# Patient Record
Sex: FEMALE | Race: WHITE | NOT HISPANIC OR LATINO | Employment: FULL TIME | URBAN - METROPOLITAN AREA
[De-identification: names, ages, dates, MRNs, and addresses within clinical notes are randomized per-mention and may not be internally consistent; named-entity substitution may affect disease eponyms.]

---

## 2020-12-14 ENCOUNTER — OFFICE VISIT (OUTPATIENT)
Dept: PODIATRY | Facility: CLINIC | Age: 60
End: 2020-12-14
Payer: COMMERCIAL

## 2020-12-14 VITALS
SYSTOLIC BLOOD PRESSURE: 128 MMHG | HEIGHT: 64 IN | WEIGHT: 110 LBS | BODY MASS INDEX: 18.78 KG/M2 | DIASTOLIC BLOOD PRESSURE: 69 MMHG | HEART RATE: 56 BPM

## 2020-12-14 DIAGNOSIS — M79.674 PAIN OF TOE OF RIGHT FOOT: ICD-10-CM

## 2020-12-14 DIAGNOSIS — L84 CORN OF TOE: ICD-10-CM

## 2020-12-14 DIAGNOSIS — M20.11 HALLUX VALGUS OF RIGHT FOOT: Primary | ICD-10-CM

## 2020-12-14 PROCEDURE — 1126F PR PAIN SEVERITY QUANTIFIED, NO PAIN PRESENT: ICD-10-PCS | Mod: S$GLB,,, | Performed by: PODIATRIST

## 2020-12-14 PROCEDURE — 99999 PR PBB SHADOW E&M-NEW PATIENT-LVL III: CPT | Mod: PBBFAC,,, | Performed by: PODIATRIST

## 2020-12-14 PROCEDURE — 99203 OFFICE O/P NEW LOW 30 MIN: CPT | Mod: S$GLB,,, | Performed by: PODIATRIST

## 2020-12-14 PROCEDURE — 1126F AMNT PAIN NOTED NONE PRSNT: CPT | Mod: S$GLB,,, | Performed by: PODIATRIST

## 2020-12-14 PROCEDURE — 3008F PR BODY MASS INDEX (BMI) DOCUMENTED: ICD-10-PCS | Mod: CPTII,S$GLB,, | Performed by: PODIATRIST

## 2020-12-14 PROCEDURE — 99203 PR OFFICE/OUTPT VISIT, NEW, LEVL III, 30-44 MIN: ICD-10-PCS | Mod: S$GLB,,, | Performed by: PODIATRIST

## 2020-12-14 PROCEDURE — 3008F BODY MASS INDEX DOCD: CPT | Mod: CPTII,S$GLB,, | Performed by: PODIATRIST

## 2020-12-14 PROCEDURE — 99999 PR PBB SHADOW E&M-NEW PATIENT-LVL III: ICD-10-PCS | Mod: PBBFAC,,, | Performed by: PODIATRIST

## 2020-12-14 RX ORDER — UREA 200 MG/G
1 CREAM TOPICAL DAILY
Qty: 75 G | Refills: 10 | Status: SHIPPED | OUTPATIENT
Start: 2020-12-14 | End: 2020-12-24

## 2020-12-14 RX ORDER — ESTRADIOL 0.5 MG/1
0.5 TABLET ORAL DAILY
COMMUNITY
Start: 2020-12-02 | End: 2021-06-25 | Stop reason: SDUPTHER

## 2020-12-14 RX ORDER — PRASTERONE 6.5 MG/1
INSERT VAGINAL
COMMUNITY
Start: 2020-11-05 | End: 2021-06-01

## 2020-12-14 NOTE — PROGRESS NOTES
Chief Complaint   Patient presents with    Callouses             HPI:   Lily Fraire is a 60 y.o. female who presents to clinic with complaints of a painful corn on the right 2nd toe.  Present before Thanksgiving.  She reported that she started using salicylic acid over-the-counter pad to the toe.  Reported that area started to burn.  Feeling a little bit better today.  She is on her feet a lot during the day.  Today she is wearing low heeled boots that are steeled toe.  States that they are uncomfortable.  She has been wearing them close to a year.  She does have athletic shoes that she wears when she is exercising.   Denies acute trauma to the feet..      PCP:  Primary Doctor No        There is no problem list on file for this patient.          Current Outpatient Medications on File Prior to Visit   Medication Sig Dispense Refill    estradioL (ESTRACE) 0.5 MG tablet Take 0.5 mg by mouth once daily.      INTRAROSA 6.5 mg Inst INSERT 1 SUPPOSITORY VAGINALLY AT BEDTIME       No current facility-administered medications on file prior to visit.          ALLG:  Review of patient's allergies indicates:  No Known Allergies        Social History     Socioeconomic History    Marital status:      Spouse name: Not on file    Number of children: Not on file    Years of education: Not on file    Highest education level: Not on file   Occupational History    Not on file   Social Needs    Financial resource strain: Not on file    Food insecurity     Worry: Not on file     Inability: Not on file    Transportation needs     Medical: Not on file     Non-medical: Not on file   Tobacco Use    Smoking status: Never Smoker    Smokeless tobacco: Never Used   Substance and Sexual Activity    Alcohol use: Not Currently    Drug use: Not Currently    Sexual activity: Not Currently   Lifestyle    Physical activity     Days per week: Not on file     Minutes per session: Not on file    Stress: Not on file  "  Relationships    Social connections     Talks on phone: Not on file     Gets together: Not on file     Attends Druze service: Not on file     Active member of club or organization: Not on file     Attends meetings of clubs or organizations: Not on file     Relationship status: Not on file   Other Topics Concern    Not on file   Social History Narrative    Not on file           ROS:  General ROS: negative for - chills, fatigue or fever  Cardiovascular ROS: no chest pain or dyspnea on exertion  Musculoskeletal ROS: negative for - joint pain or joint stiffness   Skin: Negative for rash, negative for nail or hair changes. Positive for  Corn/callus on the foot.       EXAM:  Vitals:    12/14/20 0802   BP: 128/69   BP Location: Left arm   Patient Position: Sitting   BP Method: Medium (Automatic)   Pulse: (!) 56   Weight: 49.9 kg (110 lb)   Height: 5' 4" (1.626 m)        General: alert and orient and in no apparent distress.        Bilateral foot:  Vasc:   Palpable pedal pulses  feet appropriately warm to touch.   Capillary refill time within normal limits.   Edema: Absent    Neuro:   Epicritic sensation intact.   Tinels sign:  Absent  Mulders click: Absent  SWMF: Present    MSK:     bunion on both sides  Muscle strength:  5/5  Joints:  without crepitus  Deformity:  Hallux valgus bilateral.  Flexible hammertoes.      Derm:      Hyperkeratosis at the P IPJ of the right 2nd toe where it abuts the hallux.  There is an area of irritation/redness where the 2nd toe hits the big toe.  No open wounds.   No other open lesions, macerations, or rashes noted  Erythema:  mild at the affected location due to irritation  Bruising:  absent        ASSESSMENT / PLAN:      Problem List Items Addressed This Visit     None      Visit Diagnoses     Hallux valgus of right foot    -  Primary    Corn of toe        Relevant Medications    urea 20 % Crea    Pain of toe of right foot        Relevant Medications    urea 20 % Crea          · I " counseled the patient on the patient's conditions, their implications and medical management.  · Urea 20% as ordered.  Use daily.  · Consider moleskin to protect the area.  Mole skin applied today.  · Consider bunion night splint to prevent worsening of the hallux valgus, bilateral.   Shoe and activity modification as needed for relief.   She will get more comfortable shoes.   Call or return to clinic prn if these symptoms worsen or fail to improve as anticipated.  Patient is amenable to plan.

## 2020-12-24 ENCOUNTER — OFFICE VISIT (OUTPATIENT)
Dept: PRIMARY CARE CLINIC | Facility: CLINIC | Age: 60
End: 2020-12-24
Payer: COMMERCIAL

## 2020-12-24 ENCOUNTER — TELEPHONE (OUTPATIENT)
Dept: PRIMARY CARE CLINIC | Facility: CLINIC | Age: 60
End: 2020-12-24

## 2020-12-24 ENCOUNTER — PATIENT OUTREACH (OUTPATIENT)
Dept: ADMINISTRATIVE | Facility: HOSPITAL | Age: 60
End: 2020-12-24

## 2020-12-24 VITALS
DIASTOLIC BLOOD PRESSURE: 72 MMHG | OXYGEN SATURATION: 97 % | SYSTOLIC BLOOD PRESSURE: 110 MMHG | BODY MASS INDEX: 18.71 KG/M2 | HEART RATE: 63 BPM | RESPIRATION RATE: 16 BRPM | TEMPERATURE: 97 F | WEIGHT: 109.56 LBS | HEIGHT: 64 IN

## 2020-12-24 DIAGNOSIS — Z23 NEED FOR VACCINATION: ICD-10-CM

## 2020-12-24 DIAGNOSIS — Z76.89 ENCOUNTER TO ESTABLISH CARE: ICD-10-CM

## 2020-12-24 DIAGNOSIS — Z11.59 NEED FOR HEPATITIS C SCREENING TEST: ICD-10-CM

## 2020-12-24 DIAGNOSIS — Z01.818 PREOPERATIVE EXAMINATION: ICD-10-CM

## 2020-12-24 DIAGNOSIS — Z11.4 SCREENING FOR HIV (HUMAN IMMUNODEFICIENCY VIRUS): ICD-10-CM

## 2020-12-24 DIAGNOSIS — Z00.00 ANNUAL PHYSICAL EXAM: Primary | ICD-10-CM

## 2020-12-24 DIAGNOSIS — Z13.6 ENCOUNTER FOR SCREENING FOR CARDIOVASCULAR DISORDERS: ICD-10-CM

## 2020-12-24 PROCEDURE — 3008F BODY MASS INDEX DOCD: CPT | Mod: CPTII,S$GLB,, | Performed by: FAMILY MEDICINE

## 2020-12-24 PROCEDURE — 99999 PR PBB SHADOW E&M-EST. PATIENT-LVL III: ICD-10-PCS | Mod: PBBFAC,,, | Performed by: FAMILY MEDICINE

## 2020-12-24 PROCEDURE — 93005 ELECTROCARDIOGRAM TRACING: CPT | Mod: S$GLB,,, | Performed by: FAMILY MEDICINE

## 2020-12-24 PROCEDURE — 99386 PREV VISIT NEW AGE 40-64: CPT | Mod: S$GLB,,, | Performed by: FAMILY MEDICINE

## 2020-12-24 PROCEDURE — 3008F PR BODY MASS INDEX (BMI) DOCUMENTED: ICD-10-PCS | Mod: CPTII,S$GLB,, | Performed by: FAMILY MEDICINE

## 2020-12-24 PROCEDURE — 1126F PR PAIN SEVERITY QUANTIFIED, NO PAIN PRESENT: ICD-10-PCS | Mod: S$GLB,,, | Performed by: FAMILY MEDICINE

## 2020-12-24 PROCEDURE — 1126F AMNT PAIN NOTED NONE PRSNT: CPT | Mod: S$GLB,,, | Performed by: FAMILY MEDICINE

## 2020-12-24 PROCEDURE — 99999 PR PBB SHADOW E&M-EST. PATIENT-LVL III: CPT | Mod: PBBFAC,,, | Performed by: FAMILY MEDICINE

## 2020-12-24 PROCEDURE — 99386 PR PREVENTIVE VISIT,NEW,40-64: ICD-10-PCS | Mod: S$GLB,,, | Performed by: FAMILY MEDICINE

## 2020-12-24 PROCEDURE — 93005 EKG 12-LEAD: ICD-10-PCS | Mod: S$GLB,,, | Performed by: FAMILY MEDICINE

## 2020-12-24 RX ORDER — ZOSTER VACCINE RECOMBINANT, ADJUVANTED 50 MCG/0.5
0.5 KIT INTRAMUSCULAR ONCE
Qty: 1 EACH | Refills: 0 | Status: SHIPPED | OUTPATIENT
Start: 2021-01-13 | End: 2021-01-13

## 2020-12-24 NOTE — TELEPHONE ENCOUNTER
----- Message from Melquiades Parry MD sent at 12/24/2020  9:14 AM CST -----  Colonoscopy done roughly 2 years ago by Dr. Sis Goldberg

## 2020-12-24 NOTE — PROGRESS NOTES
"Subjective:       Patient ID: Lily Fraire is a 60 y.o. female.    Chief Complaint: Establish Care and Pre-op Exam    Here to establish care, has not had PCP for routine checkup in years, though she is up-to-date on breast and colon cancer screening.  Scheduled for cosmetic surgery (face-lift) in early February, needs surgical clearance.  No recent illness or injury.  No previous surgical complications.    Review of Systems   Constitutional: Negative for chills, fatigue and fever.   HENT: Negative for congestion.    Eyes: Negative for visual disturbance.   Respiratory: Negative for cough and shortness of breath.    Cardiovascular: Negative for chest pain.   Gastrointestinal: Negative for abdominal pain, blood in stool, nausea and vomiting.   Endocrine: Negative for polyuria.   Genitourinary: Negative for difficulty urinating.   Musculoskeletal: Negative for arthralgias.   Skin: Negative for rash and wound.   Allergic/Immunologic: Negative for immunocompromised state.   Neurological: Negative for dizziness.   Hematological: Does not bruise/bleed easily.   Psychiatric/Behavioral: Negative for sleep disturbance.       Objective:      Vitals:    12/24/20 0854   BP: 110/72   BP Location: Left arm   Patient Position: Sitting   BP Method: Medium (Manual)   Pulse: 63   Resp: 16   Temp: 97.2 °F (36.2 °C)   TempSrc: Temporal   SpO2: 97%   Weight: 49.7 kg (109 lb 9.1 oz)   Height: 5' 4" (1.626 m)     Physical Exam  Vitals signs and nursing note reviewed.   Constitutional:       Appearance: She is well-developed.   HENT:      Head: Normocephalic and atraumatic.   Eyes:      Pupils: Pupils are equal, round, and reactive to light.   Neck:      Musculoskeletal: Neck supple.      Vascular: No carotid bruit or JVD.   Cardiovascular:      Rate and Rhythm: Normal rate and regular rhythm.      Pulses:           Radial pulses are 2+ on the right side and 2+ on the left side.      Heart sounds: Normal heart sounds.   Pulmonary:      " Effort: Pulmonary effort is normal.      Breath sounds: Normal breath sounds.   Abdominal:      General: Bowel sounds are normal. There is no distension.      Palpations: Abdomen is soft.      Tenderness: There is no abdominal tenderness.   Skin:     General: Skin is warm and dry.   Neurological:      Mental Status: She is alert and oriented to person, place, and time.   Psychiatric:         Behavior: Behavior normal.         Lab Results   Component Value Date    WBC 3.70 (L) 12/24/2020    HGB 13.5 12/24/2020    HCT 40.5 12/24/2020     12/24/2020    CHOL 215 (H) 12/24/2020    TRIG 41 12/24/2020    HDL 91 (H) 12/24/2020    ALT 17 12/24/2020    AST 25 12/24/2020     12/24/2020    K 3.8 12/24/2020     12/24/2020    CREATININE 0.5 12/24/2020    BUN 19 12/24/2020    CO2 28 12/24/2020    TSH 1.19 12/24/2020    INR 1.0 12/24/2020      Assessment:       1. Annual physical exam    2. Encounter to establish care    3. Need for vaccination    4. Preoperative examination    5. Encounter for screening for cardiovascular disorders    6. Need for hepatitis C screening test    7. Screening for HIV (human immunodeficiency virus)        Plan:       Annual physical exam  -     CBC Auto Differential; Future; Expected date: 12/24/2020  -     Comprehensive Metabolic Panel; Future; Expected date: 12/24/2020  -     Lipid Panel; Future; Expected date: 12/24/2020  -     Hepatitis C Antibody; Future; Expected date: 12/24/2020  -     HIV 1/2 Ag/Ab (4th Gen); Future; Expected date: 12/24/2020  -     EKG 12-lead  -     TSH; Future; Expected date: 12/24/2020  EKG and labs reviewed, no worrisome findings  Encounter to establish care    Need for vaccination  -     varicella-zoster gE-AS01B, PF, (SHINGRIX, PF,) 50 mcg/0.5 mL injection; Inject 0.5 mLs into the muscle once. for 1 dose  Dispense: 1 each; Refill: 0    Preoperative examination  -     EKG 12-lead  -     Protime-INR; Future; Expected date: 12/24/2020  -     APTT; Future;  Expected date: 12/24/2020  Patient is medically cleared for upcoming cosmetic surgery under general anesthesia  Encounter for screening for cardiovascular disorders  -     CBC Auto Differential; Future; Expected date: 12/24/2020  -     Comprehensive Metabolic Panel; Future; Expected date: 12/24/2020  -     Lipid Panel; Future; Expected date: 12/24/2020  -     EKG 12-lead    Need for hepatitis C screening test  -     Hepatitis C Antibody; Future; Expected date: 12/24/2020    Screening for HIV (human immunodeficiency virus)  -     HIV 1/2 Ag/Ab (4th Gen); Future; Expected date: 12/24/2020      Medication List with Changes/Refills   New Medications    VARICELLA-ZOSTER GE-AS01B, PF, (SHINGRIX, PF,) 50 MCG/0.5 ML INJECTION    Inject 0.5 mLs into the muscle once. for 1 dose   Current Medications    ESTRADIOL (ESTRACE) 0.5 MG TABLET    Take 0.5 mg by mouth once daily.    INTRAROSA 6.5 MG INST    INSERT 1 SUPPOSITORY VAGINALLY AT BEDTIME   Discontinued Medications    UREA 20 % CREA    Apply 1 application topically once daily. To affected area on the foot.

## 2020-12-24 NOTE — TELEPHONE ENCOUNTER
----- Message from Melquiades Parry MD sent at 12/24/2020  9:12 AM CST -----  Had mammogram at [facility] in [place] over the summer

## 2020-12-24 NOTE — LETTER
AUTHORIZATION FOR RELEASE OF   CONFIDENTIAL INFORMATION    Dear Dr. Goldberg,    We are seeing Lily Fraire, date of birth 1960, in the clinic at [facility]. Melquiades Parry MD is the patient's PCP. Lily Fraire has an outstanding lab/procedure at the time we reviewed her chart. In order to help keep her health information updated, she has authorized us to request the following medical record(s):        (  )  MAMMOGRAM                                      ( x )  COLONOSCOPY      (  )  PAP SMEAR                                          (  )  OUTSIDE LAB RESULTS     (  )  DEXA SCAN                                          (  )  EYE EXAM            (  )  FOOT EXAM                                          (  )  ENTIRE RECORD     (  )  OUTSIDE IMMUNIZATIONS                 (  )  _______________         Please fax records to [facility], Melquiades aPrry MD, [phone]     If you have any questions, please contact Sally at [phone].           Patient Name: Lily Fraire  : 1960  Patient Phone #: [phone]

## 2021-04-16 ENCOUNTER — PATIENT MESSAGE (OUTPATIENT)
Dept: RESEARCH | Facility: HOSPITAL | Age: 61
End: 2021-04-16

## 2021-04-26 ENCOUNTER — PATIENT OUTREACH (OUTPATIENT)
Dept: ADMINISTRATIVE | Facility: HOSPITAL | Age: 61
End: 2021-04-26

## 2021-06-01 ENCOUNTER — OFFICE VISIT (OUTPATIENT)
Dept: OBSTETRICS AND GYNECOLOGY | Facility: CLINIC | Age: 61
End: 2021-06-01
Payer: COMMERCIAL

## 2021-06-01 VITALS
WEIGHT: 102.06 LBS | SYSTOLIC BLOOD PRESSURE: 116 MMHG | BODY MASS INDEX: 17.52 KG/M2 | DIASTOLIC BLOOD PRESSURE: 78 MMHG

## 2021-06-01 DIAGNOSIS — N95.2 POST-MENOPAUSAL ATROPHIC VAGINITIS: ICD-10-CM

## 2021-06-01 DIAGNOSIS — N76.0 ACUTE VAGINITIS: Primary | ICD-10-CM

## 2021-06-01 DIAGNOSIS — Z12.31 VISIT FOR SCREENING MAMMOGRAM: ICD-10-CM

## 2021-06-01 DIAGNOSIS — Z79.890 POST-MENOPAUSE ON HRT (HORMONE REPLACEMENT THERAPY): ICD-10-CM

## 2021-06-01 PROCEDURE — 99203 OFFICE O/P NEW LOW 30 MIN: CPT | Mod: S$GLB,,, | Performed by: NURSE PRACTITIONER

## 2021-06-01 PROCEDURE — 1126F AMNT PAIN NOTED NONE PRSNT: CPT | Mod: S$GLB,,, | Performed by: NURSE PRACTITIONER

## 2021-06-01 PROCEDURE — 99999 PR PBB SHADOW E&M-EST. PATIENT-LVL III: ICD-10-PCS | Mod: PBBFAC,,, | Performed by: NURSE PRACTITIONER

## 2021-06-01 PROCEDURE — 3008F PR BODY MASS INDEX (BMI) DOCUMENTED: ICD-10-PCS | Mod: CPTII,S$GLB,, | Performed by: NURSE PRACTITIONER

## 2021-06-01 PROCEDURE — 99203 PR OFFICE/OUTPT VISIT, NEW, LEVL III, 30-44 MIN: ICD-10-PCS | Mod: S$GLB,,, | Performed by: NURSE PRACTITIONER

## 2021-06-01 PROCEDURE — 99999 PR PBB SHADOW E&M-EST. PATIENT-LVL III: CPT | Mod: PBBFAC,,, | Performed by: NURSE PRACTITIONER

## 2021-06-01 PROCEDURE — 1126F PR PAIN SEVERITY QUANTIFIED, NO PAIN PRESENT: ICD-10-PCS | Mod: S$GLB,,, | Performed by: NURSE PRACTITIONER

## 2021-06-01 PROCEDURE — 3008F BODY MASS INDEX DOCD: CPT | Mod: CPTII,S$GLB,, | Performed by: NURSE PRACTITIONER

## 2021-06-01 PROCEDURE — 87481 CANDIDA DNA AMP PROBE: CPT | Mod: 59 | Performed by: NURSE PRACTITIONER

## 2021-06-01 PROCEDURE — 87661 TRICHOMONAS VAGINALIS AMPLIF: CPT | Mod: 59 | Performed by: NURSE PRACTITIONER

## 2021-06-01 RX ORDER — METRONIDAZOLE 500 MG/1
500 TABLET ORAL EVERY 12 HOURS
Qty: 14 TABLET | Refills: 0 | Status: SHIPPED | OUTPATIENT
Start: 2021-06-01 | End: 2021-06-08

## 2021-06-01 RX ORDER — CLOTRIMAZOLE AND BETAMETHASONE DIPROPIONATE 10; .64 MG/G; MG/G
CREAM TOPICAL
Qty: 15 G | Refills: 1 | Status: SHIPPED | OUTPATIENT
Start: 2021-06-01 | End: 2021-06-01

## 2021-06-01 RX ORDER — FLUCONAZOLE 200 MG/1
200 TABLET ORAL
Qty: 2 TABLET | Refills: 0 | Status: SHIPPED | OUTPATIENT
Start: 2021-06-01 | End: 2022-07-01

## 2021-06-03 LAB
BACTERIAL VAGINOSIS DNA: NEGATIVE
CANDIDA GLABRATA DNA: NEGATIVE
CANDIDA KRUSEI DNA: NEGATIVE
CANDIDA RRNA VAG QL PROBE: NEGATIVE
T VAGINALIS RRNA GENITAL QL PROBE: NEGATIVE

## 2021-06-17 ENCOUNTER — TELEPHONE (OUTPATIENT)
Dept: OBSTETRICS AND GYNECOLOGY | Facility: CLINIC | Age: 61
End: 2021-06-17

## 2021-06-28 RX ORDER — ESTRADIOL 0.5 MG/1
0.5 TABLET ORAL DAILY
Qty: 90 TABLET | Refills: 3 | Status: SHIPPED | OUTPATIENT
Start: 2021-06-28 | End: 2022-07-01 | Stop reason: SDUPTHER

## 2021-07-01 ENCOUNTER — PATIENT MESSAGE (OUTPATIENT)
Dept: OBSTETRICS AND GYNECOLOGY | Facility: CLINIC | Age: 61
End: 2021-07-01

## 2021-07-02 ENCOUNTER — TELEPHONE (OUTPATIENT)
Dept: OBSTETRICS AND GYNECOLOGY | Facility: CLINIC | Age: 61
End: 2021-07-02

## 2021-07-02 ENCOUNTER — HOSPITAL ENCOUNTER (OUTPATIENT)
Dept: RADIOLOGY | Facility: OTHER | Age: 61
Discharge: HOME | End: 2021-07-02
Attending: NURSE PRACTITIONER
Payer: COMMERCIAL

## 2021-07-02 DIAGNOSIS — Z12.31 VISIT FOR SCREENING MAMMOGRAM: ICD-10-CM

## 2021-07-02 PROCEDURE — 77067 SCR MAMMO BI INCL CAD: CPT | Mod: TC

## 2021-07-02 PROCEDURE — 77067 MAMMO DIGITAL SCREENING BILAT WITH TOMO: ICD-10-PCS | Mod: 26,,, | Performed by: RADIOLOGY

## 2021-07-02 PROCEDURE — 77063 MAMMO DIGITAL SCREENING BILAT WITH TOMO: ICD-10-PCS | Mod: 26,,, | Performed by: RADIOLOGY

## 2021-07-02 PROCEDURE — 77067 SCR MAMMO BI INCL CAD: CPT | Mod: 26,,, | Performed by: RADIOLOGY

## 2021-07-02 PROCEDURE — 77063 BREAST TOMOSYNTHESIS BI: CPT | Mod: 26,,, | Performed by: RADIOLOGY

## 2021-07-22 ENCOUNTER — PATIENT MESSAGE (OUTPATIENT)
Dept: OBSTETRICS AND GYNECOLOGY | Facility: CLINIC | Age: 61
End: 2021-07-22

## 2021-09-29 DIAGNOSIS — Z12.11 COLON CANCER SCREENING: ICD-10-CM

## 2021-10-05 ENCOUNTER — PATIENT MESSAGE (OUTPATIENT)
Dept: ADMINISTRATIVE | Facility: HOSPITAL | Age: 61
End: 2021-10-05

## 2022-01-11 ENCOUNTER — TELEPHONE (OUTPATIENT)
Dept: PRIMARY CARE CLINIC | Facility: CLINIC | Age: 62
End: 2022-01-11
Payer: COMMERCIAL

## 2022-01-11 ENCOUNTER — OFFICE VISIT (OUTPATIENT)
Dept: PRIMARY CARE CLINIC | Facility: CLINIC | Age: 62
End: 2022-01-11
Payer: COMMERCIAL

## 2022-01-11 VITALS
RESPIRATION RATE: 18 BRPM | DIASTOLIC BLOOD PRESSURE: 82 MMHG | HEART RATE: 60 BPM | BODY MASS INDEX: 18.06 KG/M2 | SYSTOLIC BLOOD PRESSURE: 124 MMHG | WEIGHT: 105.81 LBS | HEIGHT: 64 IN | OXYGEN SATURATION: 99 %

## 2022-01-11 DIAGNOSIS — Z00.00 ANNUAL PHYSICAL EXAM: Primary | ICD-10-CM

## 2022-01-11 PROCEDURE — 3074F PR MOST RECENT SYSTOLIC BLOOD PRESSURE < 130 MM HG: ICD-10-PCS | Mod: CPTII,S$GLB,, | Performed by: FAMILY MEDICINE

## 2022-01-11 PROCEDURE — 99396 PREV VISIT EST AGE 40-64: CPT | Mod: S$GLB,,, | Performed by: FAMILY MEDICINE

## 2022-01-11 PROCEDURE — 3008F PR BODY MASS INDEX (BMI) DOCUMENTED: ICD-10-PCS | Mod: CPTII,S$GLB,, | Performed by: FAMILY MEDICINE

## 2022-01-11 PROCEDURE — 3008F BODY MASS INDEX DOCD: CPT | Mod: CPTII,S$GLB,, | Performed by: FAMILY MEDICINE

## 2022-01-11 PROCEDURE — 3079F DIAST BP 80-89 MM HG: CPT | Mod: CPTII,S$GLB,, | Performed by: FAMILY MEDICINE

## 2022-01-11 PROCEDURE — 1159F PR MEDICATION LIST DOCUMENTED IN MEDICAL RECORD: ICD-10-PCS | Mod: CPTII,S$GLB,, | Performed by: FAMILY MEDICINE

## 2022-01-11 PROCEDURE — 99999 PR PBB SHADOW E&M-EST. PATIENT-LVL III: ICD-10-PCS | Mod: PBBFAC,,, | Performed by: FAMILY MEDICINE

## 2022-01-11 PROCEDURE — 99396 PR PREVENTIVE VISIT,EST,40-64: ICD-10-PCS | Mod: S$GLB,,, | Performed by: FAMILY MEDICINE

## 2022-01-11 PROCEDURE — 3074F SYST BP LT 130 MM HG: CPT | Mod: CPTII,S$GLB,, | Performed by: FAMILY MEDICINE

## 2022-01-11 PROCEDURE — 1160F RVW MEDS BY RX/DR IN RCRD: CPT | Mod: CPTII,S$GLB,, | Performed by: FAMILY MEDICINE

## 2022-01-11 PROCEDURE — 3079F PR MOST RECENT DIASTOLIC BLOOD PRESSURE 80-89 MM HG: ICD-10-PCS | Mod: CPTII,S$GLB,, | Performed by: FAMILY MEDICINE

## 2022-01-11 PROCEDURE — 1159F MED LIST DOCD IN RCRD: CPT | Mod: CPTII,S$GLB,, | Performed by: FAMILY MEDICINE

## 2022-01-11 PROCEDURE — 99999 PR PBB SHADOW E&M-EST. PATIENT-LVL III: CPT | Mod: PBBFAC,,, | Performed by: FAMILY MEDICINE

## 2022-01-11 PROCEDURE — 1160F PR REVIEW ALL MEDS BY PRESCRIBER/CLIN PHARMACIST DOCUMENTED: ICD-10-PCS | Mod: CPTII,S$GLB,, | Performed by: FAMILY MEDICINE

## 2022-01-11 NOTE — PROGRESS NOTES
"Subjective:       Patient ID: Lily Fraire is a 61 y.o. female.    Chief Complaint: Annual Exam    Here for annual checkup.  Generally feeling well.  No specific complaints or concerns other than occasional dizziness upon standing, but symptoms resolved after a few seconds.  No exertional symptoms.    Review of Systems   Constitutional: Negative for chills, fatigue and fever.   HENT: Negative for congestion.    Eyes: Negative for visual disturbance.   Respiratory: Negative for cough and shortness of breath.    Cardiovascular: Negative for chest pain.   Gastrointestinal: Negative for abdominal pain, nausea and vomiting.   Genitourinary: Negative for difficulty urinating.   Musculoskeletal: Negative for arthralgias.   Skin: Negative for rash.   Allergic/Immunologic: Negative for immunocompromised state.   Neurological: Positive for dizziness. Negative for seizures and weakness.   Hematological: Does not bruise/bleed easily.   Psychiatric/Behavioral: Negative for sleep disturbance.       Objective:      Vitals:    01/11/22 1626   BP: 124/82   BP Location: Right arm   Patient Position: Sitting   BP Method: Medium (Manual)   Pulse: 60   Resp: 18   SpO2: 99%   Weight: 48 kg (105 lb 13.1 oz)   Height: 5' 4" (1.626 m)     Physical Exam  Vitals and nursing note reviewed.   Constitutional:       Appearance: She is well-developed and well-nourished.   HENT:      Head: Normocephalic and atraumatic.   Eyes:      Extraocular Movements: EOM normal.      Pupils: Pupils are equal, round, and reactive to light.   Neck:      Vascular: No carotid bruit or JVD.   Cardiovascular:      Rate and Rhythm: Normal rate and regular rhythm.      Pulses:           Radial pulses are 2+ on the right side and 2+ on the left side.      Heart sounds: Normal heart sounds.   Pulmonary:      Effort: Pulmonary effort is normal.      Breath sounds: Normal breath sounds.   Abdominal:      General: Bowel sounds are normal. There is no distension.      " Palpations: Abdomen is soft.      Tenderness: There is no abdominal tenderness.   Musculoskeletal:         General: No edema.      Cervical back: Neck supple.   Skin:     General: Skin is warm and dry.   Neurological:      Mental Status: She is alert and oriented to person, place, and time.   Psychiatric:         Mood and Affect: Mood and affect normal.         Behavior: Behavior normal.         Lab Results   Component Value Date    WBC 3.70 (L) 12/24/2020    HGB 13.5 12/24/2020    HCT 40.5 12/24/2020     12/24/2020    CHOL 215 (H) 12/24/2020    TRIG 41 12/24/2020    HDL 91 (H) 12/24/2020    ALT 17 12/24/2020    AST 25 12/24/2020     12/24/2020    K 3.8 12/24/2020     12/24/2020    CREATININE 0.5 12/24/2020    BUN 19 12/24/2020    CO2 28 12/24/2020    TSH 1.19 12/24/2020    INR 1.0 12/24/2020      Assessment:       1. Annual physical exam        Plan:       Annual physical exam  -     Comprehensive Metabolic Panel; Future; Expected date: 01/11/2022  -     Lipid Panel; Future; Expected date: 01/11/2022  -     TSH; Future; Expected date: 01/11/2022      Medication List with Changes/Refills   Current Medications    ESTRADIOL (ESTRACE) 0.5 MG TABLET    Take 1 tablet (0.5 mg total) by mouth once daily.    FLUCONAZOLE (DIFLUCAN) 200 MG TAB    Take 1 tablet (200 mg total) by mouth every 72 hours as needed (vaginal itching).    PRASTERONE, DHEA, (INTRAROSA) 6.5 MG INST    Place 6.5 mg vaginally every evening.

## 2022-01-11 NOTE — TELEPHONE ENCOUNTER
----- Message from Melquiades Parry MD sent at 1/11/2022  4:49 PM CST -----  JUDE to get colonoscopy report from Dr. Alegre

## 2022-01-18 ENCOUNTER — PATIENT OUTREACH (OUTPATIENT)
Dept: ADMINISTRATIVE | Facility: HOSPITAL | Age: 62
End: 2022-01-18
Payer: COMMERCIAL

## 2022-01-18 NOTE — PROGRESS NOTES
JUDE FAXED  to Dr. Alegre for patient colonoscopy. FAX# [phone]  Chart review done. HM updated. Immunizations reviewed & updated. Care Everywhere updated.

## 2022-03-18 ENCOUNTER — PATIENT MESSAGE (OUTPATIENT)
Dept: ADMINISTRATIVE | Facility: HOSPITAL | Age: 62
End: 2022-03-18
Payer: COMMERCIAL

## 2022-05-31 ENCOUNTER — PATIENT MESSAGE (OUTPATIENT)
Dept: ADMINISTRATIVE | Facility: HOSPITAL | Age: 62
End: 2022-05-31
Payer: COMMERCIAL

## 2022-07-01 ENCOUNTER — OFFICE VISIT (OUTPATIENT)
Dept: OBSTETRICS AND GYNECOLOGY | Facility: CLINIC | Age: 62
End: 2022-07-01
Payer: COMMERCIAL

## 2022-07-01 VITALS
BODY MASS INDEX: 17.69 KG/M2 | SYSTOLIC BLOOD PRESSURE: 133 MMHG | HEIGHT: 64 IN | DIASTOLIC BLOOD PRESSURE: 85 MMHG | WEIGHT: 103.63 LBS

## 2022-07-01 DIAGNOSIS — N95.2 POST-MENOPAUSAL ATROPHIC VAGINITIS: ICD-10-CM

## 2022-07-01 DIAGNOSIS — Z00.00 WELL WOMAN EXAM (NO GYNECOLOGICAL EXAM): Primary | ICD-10-CM

## 2022-07-01 DIAGNOSIS — Z79.890 POST-MENOPAUSE ON HRT (HORMONE REPLACEMENT THERAPY): ICD-10-CM

## 2022-07-01 DIAGNOSIS — Z12.31 VISIT FOR SCREENING MAMMOGRAM: ICD-10-CM

## 2022-07-01 PROCEDURE — 1160F RVW MEDS BY RX/DR IN RCRD: CPT | Mod: CPTII,S$GLB,, | Performed by: NURSE PRACTITIONER

## 2022-07-01 PROCEDURE — 3008F BODY MASS INDEX DOCD: CPT | Mod: CPTII,S$GLB,, | Performed by: NURSE PRACTITIONER

## 2022-07-01 PROCEDURE — 99396 PR PREVENTIVE VISIT,EST,40-64: ICD-10-PCS | Mod: S$GLB,,, | Performed by: NURSE PRACTITIONER

## 2022-07-01 PROCEDURE — 1159F MED LIST DOCD IN RCRD: CPT | Mod: CPTII,S$GLB,, | Performed by: NURSE PRACTITIONER

## 2022-07-01 PROCEDURE — 3079F DIAST BP 80-89 MM HG: CPT | Mod: CPTII,S$GLB,, | Performed by: NURSE PRACTITIONER

## 2022-07-01 PROCEDURE — 99999 PR PBB SHADOW E&M-EST. PATIENT-LVL III: ICD-10-PCS | Mod: PBBFAC,,, | Performed by: NURSE PRACTITIONER

## 2022-07-01 PROCEDURE — 99396 PREV VISIT EST AGE 40-64: CPT | Mod: S$GLB,,, | Performed by: NURSE PRACTITIONER

## 2022-07-01 PROCEDURE — 99999 PR PBB SHADOW E&M-EST. PATIENT-LVL III: CPT | Mod: PBBFAC,,, | Performed by: NURSE PRACTITIONER

## 2022-07-01 PROCEDURE — 3075F SYST BP GE 130 - 139MM HG: CPT | Mod: CPTII,S$GLB,, | Performed by: NURSE PRACTITIONER

## 2022-07-01 PROCEDURE — 3079F PR MOST RECENT DIASTOLIC BLOOD PRESSURE 80-89 MM HG: ICD-10-PCS | Mod: CPTII,S$GLB,, | Performed by: NURSE PRACTITIONER

## 2022-07-01 PROCEDURE — 1160F PR REVIEW ALL MEDS BY PRESCRIBER/CLIN PHARMACIST DOCUMENTED: ICD-10-PCS | Mod: CPTII,S$GLB,, | Performed by: NURSE PRACTITIONER

## 2022-07-01 PROCEDURE — 3075F PR MOST RECENT SYSTOLIC BLOOD PRESS GE 130-139MM HG: ICD-10-PCS | Mod: CPTII,S$GLB,, | Performed by: NURSE PRACTITIONER

## 2022-07-01 PROCEDURE — 1159F PR MEDICATION LIST DOCUMENTED IN MEDICAL RECORD: ICD-10-PCS | Mod: CPTII,S$GLB,, | Performed by: NURSE PRACTITIONER

## 2022-07-01 PROCEDURE — 3008F PR BODY MASS INDEX (BMI) DOCUMENTED: ICD-10-PCS | Mod: CPTII,S$GLB,, | Performed by: NURSE PRACTITIONER

## 2022-07-01 RX ORDER — ESTRADIOL 0.5 MG/1
0.5 TABLET ORAL DAILY
Qty: 90 TABLET | Refills: 3 | Status: SHIPPED | OUTPATIENT
Start: 2022-07-01 | End: 2023-07-05 | Stop reason: SDUPTHER
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
## 2022-07-14 ENCOUNTER — HOSPITAL ENCOUNTER (OUTPATIENT)
Dept: RADIOLOGY | Facility: HOSPITAL | Age: 62
Discharge: HOME | End: 2022-07-14
Attending: NURSE PRACTITIONER
Payer: COMMERCIAL

## 2022-07-14 VITALS — HEIGHT: 64 IN | BODY MASS INDEX: 17.69 KG/M2 | WEIGHT: 103.63 LBS

## 2022-07-14 DIAGNOSIS — Z12.31 VISIT FOR SCREENING MAMMOGRAM: ICD-10-CM

## 2022-07-14 PROCEDURE — 77063 BREAST TOMOSYNTHESIS BI: CPT | Mod: 26,,, | Performed by: RADIOLOGY

## 2022-07-14 PROCEDURE — 77067 SCR MAMMO BI INCL CAD: CPT | Mod: TC

## 2022-07-14 PROCEDURE — 77063 BREAST TOMOSYNTHESIS BI: CPT | Mod: TC

## 2022-07-14 PROCEDURE — 77067 SCR MAMMO BI INCL CAD: CPT | Mod: 26,,, | Performed by: RADIOLOGY

## 2022-07-14 PROCEDURE — 77067 MAMMO DIGITAL SCREENING BILAT WITH TOMO: ICD-10-PCS | Mod: 26,,, | Performed by: RADIOLOGY

## 2022-07-14 PROCEDURE — 77063 MAMMO DIGITAL SCREENING BILAT WITH TOMO: ICD-10-PCS | Mod: 26,,, | Performed by: RADIOLOGY

## 2022-12-09 ENCOUNTER — NURSE TRIAGE (OUTPATIENT)
Dept: ADMINISTRATIVE | Facility: CLINIC | Age: 62
End: 2022-12-09
Payer: COMMERCIAL

## 2022-12-09 NOTE — TELEPHONE ENCOUNTER
Transferred from patient access. States she just wants to get earlier appointment with Dr. Parry. SOB for a week, wake up in middle of night with fluttering, fatigue, light headedness. Explained triage process. Triage done- see in office today. No appointments available, advised UC if office did not call her. She would like lab appointment. Instructed to call back for any further questions or concerns.         Reason for Disposition   Age > 60 years  (Exception: Brief heartbeat symptoms that went away and now feels well.)    Additional Information   Negative: Passed out (i.e., lost consciousness, collapsed and was not responding)   Negative: Shock suspected (e.g., cold/pale/clammy skin, too weak to stand, low BP, rapid pulse)   Negative: Difficult to awaken or acting confused (e.g., disoriented, slurred speech)   Negative: Visible sweat on face or sweat dripping down face   Negative: Unable to walk, or can only walk with assistance (e.g., requires support)   Negative: Received SHOCK from implantable cardiac defibrillator and has persisting symptoms (i.e., palpitations, lightheadedness)   Negative: Dizziness, lightheadedness, or weakness and heart beating very rapidly (e.g., > 140 / minute)   Negative: Sounds like a life-threatening emergency to the triager   Negative: Dizziness, lightheadedness, or weakness and heart beating very slowly (e.g., < 50 / minute)   Negative: Chest pain   Negative: Difficulty breathing   Negative: Dizziness, lightheadedness, or weakness   Negative: Heart beating very rapidly (e.g., > 140 / minute) and present now  (Exception: During exercise.)   Negative: Heart beating very slowly (e.g., < 50 / minute)  (Exception: Athlete and heart rate normal for caller.)   Negative: New or worsened shortness of breath with activity (dyspnea on exertion)   Negative: Patient sounds very sick or weak to the triager   Negative: Wearing a 'Holter monitor' or 'cardiac event monitor'   Negative: Received  SHOCK from implantable cardiac defibrillator (and now feels well)   Negative: Heart beating very rapidly (e.g., > 140 / minute) and not present now  (Exception: During exercise.)   Negative: Skipped or extra beat(s) and increases with exercise or exertion   Negative: Skipped or extra beat(s) and occurs 4 or more times per minute   Negative: History of heart disease (i.e., heart attack, bypass surgery, angina, angioplasty)  (Exception: Brief heartbeat symptoms that went away and now feels well.)    Protocols used: Heart Rate and Heartbeat Xnbrquaod-M-VG

## 2022-12-09 NOTE — TELEPHONE ENCOUNTER
I called and spoke to pt who adv having SOB and heart palpitations for week and a half. Pt adv palpitations are usually at night and wakes her from sleep. Pt denies any chest pain and no numbness or pain in left arm. Pt adv she walks on her treadmill for an hour every night and has no episodes. Pt doesn't necessarily want to go  to the ER. I spoke with Dr. Parry who advised we can work the patient in on Monday 12/12/22 since there are no appts with any of the providers in clinic today and if pt worsens before that appt she will need to be seen in the ER. Pt voiced understanding. Appt josse 12/12/22 at 2:45pm.

## 2022-12-12 ENCOUNTER — OFFICE VISIT (OUTPATIENT)
Dept: PRIMARY CARE CLINIC | Facility: CLINIC | Age: 62
End: 2022-12-12
Payer: COMMERCIAL

## 2022-12-12 VITALS
BODY MASS INDEX: 18.63 KG/M2 | OXYGEN SATURATION: 99 % | HEIGHT: 64 IN | DIASTOLIC BLOOD PRESSURE: 62 MMHG | TEMPERATURE: 98 F | RESPIRATION RATE: 18 BRPM | SYSTOLIC BLOOD PRESSURE: 120 MMHG | HEART RATE: 67 BPM | WEIGHT: 109.13 LBS

## 2022-12-12 DIAGNOSIS — R00.2 PALPITATIONS: Primary | ICD-10-CM

## 2022-12-12 DIAGNOSIS — R06.02 SOB (SHORTNESS OF BREATH): ICD-10-CM

## 2022-12-12 PROCEDURE — 93005 ELECTROCARDIOGRAM TRACING: CPT | Mod: S$GLB,,, | Performed by: FAMILY MEDICINE

## 2022-12-12 PROCEDURE — 3008F PR BODY MASS INDEX (BMI) DOCUMENTED: ICD-10-PCS | Mod: CPTII,S$GLB,, | Performed by: FAMILY MEDICINE

## 2022-12-12 PROCEDURE — 93010 ELECTROCARDIOGRAM REPORT: CPT | Mod: S$GLB,,, | Performed by: INTERNAL MEDICINE

## 2022-12-12 PROCEDURE — 3074F PR MOST RECENT SYSTOLIC BLOOD PRESSURE < 130 MM HG: ICD-10-PCS | Mod: CPTII,S$GLB,, | Performed by: FAMILY MEDICINE

## 2022-12-12 PROCEDURE — 1159F PR MEDICATION LIST DOCUMENTED IN MEDICAL RECORD: ICD-10-PCS | Mod: CPTII,S$GLB,, | Performed by: FAMILY MEDICINE

## 2022-12-12 PROCEDURE — 99214 OFFICE O/P EST MOD 30 MIN: CPT | Mod: S$GLB,,, | Performed by: FAMILY MEDICINE

## 2022-12-12 PROCEDURE — 3074F SYST BP LT 130 MM HG: CPT | Mod: CPTII,S$GLB,, | Performed by: FAMILY MEDICINE

## 2022-12-12 PROCEDURE — 93005 EKG 12-LEAD: ICD-10-PCS | Mod: S$GLB,,, | Performed by: FAMILY MEDICINE

## 2022-12-12 PROCEDURE — 3078F DIAST BP <80 MM HG: CPT | Mod: CPTII,S$GLB,, | Performed by: FAMILY MEDICINE

## 2022-12-12 PROCEDURE — 99999 PR PBB SHADOW E&M-EST. PATIENT-LVL III: ICD-10-PCS | Mod: PBBFAC,,, | Performed by: FAMILY MEDICINE

## 2022-12-12 PROCEDURE — 1159F MED LIST DOCD IN RCRD: CPT | Mod: CPTII,S$GLB,, | Performed by: FAMILY MEDICINE

## 2022-12-12 PROCEDURE — 1160F PR REVIEW ALL MEDS BY PRESCRIBER/CLIN PHARMACIST DOCUMENTED: ICD-10-PCS | Mod: CPTII,S$GLB,, | Performed by: FAMILY MEDICINE

## 2022-12-12 PROCEDURE — 93010 EKG 12-LEAD: ICD-10-PCS | Mod: S$GLB,,, | Performed by: INTERNAL MEDICINE

## 2022-12-12 PROCEDURE — 99999 PR PBB SHADOW E&M-EST. PATIENT-LVL III: CPT | Mod: PBBFAC,,, | Performed by: FAMILY MEDICINE

## 2022-12-12 PROCEDURE — 3008F BODY MASS INDEX DOCD: CPT | Mod: CPTII,S$GLB,, | Performed by: FAMILY MEDICINE

## 2022-12-12 PROCEDURE — 3078F PR MOST RECENT DIASTOLIC BLOOD PRESSURE < 80 MM HG: ICD-10-PCS | Mod: CPTII,S$GLB,, | Performed by: FAMILY MEDICINE

## 2022-12-12 PROCEDURE — 99214 PR OFFICE/OUTPT VISIT, EST, LEVL IV, 30-39 MIN: ICD-10-PCS | Mod: S$GLB,,, | Performed by: FAMILY MEDICINE

## 2022-12-12 PROCEDURE — 1160F RVW MEDS BY RX/DR IN RCRD: CPT | Mod: CPTII,S$GLB,, | Performed by: FAMILY MEDICINE

## 2022-12-12 NOTE — PROGRESS NOTES
"Subjective:       Patient ID: Lily Fraire is a 62 y.o. female.    Chief Complaint: Shortness of Breath and Palpitations (Pt in for evaluation of shortness of breath and palpitations)    Has been feeling lightheaded and short of breath, mainly in the morning, for the past week. Also waking up every night with palpitations/fluttering sensation in chest. No associated chest pain. Say "my insides feel nervous." Says she is under stress, but no more than usual. 43 yo daughter recently diagnosed with skin CA of eyelid. Multiple family members have had CAD. Denies excessive/change of caffeine intake.    Review of Systems   Constitutional:  Negative for appetite change, chills, fever and unexpected weight change.   HENT:  Negative for trouble swallowing.    Respiratory:  Positive for shortness of breath. Negative for cough and wheezing.    Cardiovascular:  Positive for palpitations. Negative for chest pain.   Gastrointestinal:  Negative for nausea and vomiting.   Genitourinary:  Negative for difficulty urinating.   Skin:  Negative for rash.   Allergic/Immunologic: Negative for immunocompromised state.   Neurological:  Positive for light-headedness.   Hematological:  Does not bruise/bleed easily.     Objective:      Vitals:    12/12/22 1500   BP: 120/62   BP Location: Left arm   Patient Position: Sitting   BP Method: Medium (Manual)   Pulse: 67   Resp: 18   Temp: 98.2 °F (36.8 °C)   TempSrc: Temporal   SpO2: 99%   Weight: 49.5 kg (109 lb 2 oz)   Height: 5' 4" (1.626 m)     Physical Exam  Vitals and nursing note reviewed.   Constitutional:       General: She is not in acute distress.     Appearance: Normal appearance. She is well-developed.   HENT:      Head: Normocephalic and atraumatic.   Neck:      Vascular: No carotid bruit.   Cardiovascular:      Rate and Rhythm: Normal rate and regular rhythm.      Heart sounds: Normal heart sounds.   Pulmonary:      Effort: Pulmonary effort is normal.      Breath sounds: Normal " breath sounds.   Abdominal:      General: There is no distension.      Tenderness: There is no abdominal tenderness.   Musculoskeletal:      Right lower leg: No edema.      Left lower leg: No edema.   Skin:     General: Skin is warm and dry.   Neurological:      Mental Status: She is alert and oriented to person, place, and time.   Psychiatric:         Mood and Affect: Mood normal.         Behavior: Behavior normal.       Lab Results   Component Value Date    WBC 3.70 (L) 12/24/2020    HGB 13.5 12/24/2020    HCT 40.5 12/24/2020     12/24/2020    CHOL 213 (H) 01/16/2022    TRIG 45 01/16/2022    HDL 88 (H) 01/16/2022    ALT 20 01/16/2022    AST 24 01/16/2022     01/16/2022    K 4.1 01/16/2022     01/16/2022    CREATININE 0.8 01/16/2022    BUN 8 01/16/2022    CO2 27 01/16/2022    TSH 2.549 01/16/2022    INR 1.0 12/24/2020      Assessment:       1. Palpitations    2. SOB (shortness of breath)        Plan:       Palpitations  -     EKG 12-lead  -     CBC Auto Differential; Future; Expected date: 12/12/2022  -     Comprehensive Metabolic Panel; Future; Expected date: 12/12/2022  -     TSH; Future; Expected date: 12/12/2022  -     Ferritin; Future; Expected date: 12/12/2022  -     Iron and TIBC; Future; Expected date: 12/12/2022  -     Vitamin B12; Future; Expected date: 12/12/2022  -     Folate; Future; Expected date: 12/12/2022  -     Magnesium; Future; Expected date: 12/12/2022  -     Holter monitor - 24 hour; Future; Expected date: 12/13/2022  -     Echo; Future; Expected date: 12/13/2022    SOB (shortness of breath)  -     EKG 12-lead  -     CBC Auto Differential; Future; Expected date: 12/12/2022  -     Comprehensive Metabolic Panel; Future; Expected date: 12/12/2022  -     TSH; Future; Expected date: 12/12/2022  -     Ferritin; Future; Expected date: 12/12/2022  -     Iron and TIBC; Future; Expected date: 12/12/2022  -     Vitamin B12; Future; Expected date: 12/12/2022  -     Folate; Future;  Expected date: 12/12/2022  -     Magnesium; Future; Expected date: 12/12/2022  -     Holter monitor - 24 hour; Future; Expected date: 12/13/2022  -     Echo; Future; Expected date: 12/13/2022    EKG normal. Needs further workup  Medication List with Changes/Refills   Current Medications    ESTRADIOL (ESTRACE) 0.5 MG TABLET    Take 1 tablet (0.5 mg total) by mouth once daily.    PRASTERONE, DHEA, (INTRAROSA) 6.5 MG INST    Place 6.5 mg vaginally every evening.

## 2022-12-19 ENCOUNTER — HOSPITAL ENCOUNTER (OUTPATIENT)
Dept: CARDIOLOGY | Facility: HOSPITAL | Age: 62
Discharge: HOME | End: 2022-12-19
Attending: FAMILY MEDICINE
Payer: COMMERCIAL

## 2022-12-19 DIAGNOSIS — R00.2 PALPITATIONS: ICD-10-CM

## 2022-12-19 DIAGNOSIS — R06.02 SOB (SHORTNESS OF BREATH): ICD-10-CM

## 2022-12-19 LAB
ASCENDING AORTA: 3.17 CM
AV INDEX (PROSTH): 0.85
AV MEAN GRADIENT: 3 MMHG
AV PEAK GRADIENT: 6 MMHG
AV VALVE AREA: 2.8 CM2
AV VELOCITY RATIO: 0.81
CV ECHO LV RWT: 0.35 CM
DOP CALC AO PEAK VEL: 1.18 M/S
DOP CALC AO VTI: 27.8 CM
DOP CALC LVOT AREA: 3.3 CM2
DOP CALC LVOT DIAMETER: 2.05 CM
DOP CALC LVOT PEAK VEL: 0.96 M/S
DOP CALC LVOT STROKE VOLUME: 77.86 CM3
DOP CALCLVOT PEAK VEL VTI: 23.6 CM
E WAVE DECELERATION TIME: 218.57 MSEC
E/A RATIO: 1.54
E/E' RATIO: 11.07 M/S
ECHO LV POSTERIOR WALL: 0.79 CM (ref 0.6–1.1)
EJECTION FRACTION: 55 %
FRACTIONAL SHORTENING: 44 % (ref 28–44)
INTERVENTRICULAR SEPTUM: 0.7 CM (ref 0.6–1.1)
IVC DIAMETER: 1.16 CM
IVRT: 125.59 MSEC
LA MAJOR: 4.6 CM
LA MINOR: 4.59 CM
LA WIDTH: 4.6 CM
LEFT ATRIUM SIZE: 2.71 CM
LEFT ATRIUM VOLUME: 48.69 CM3
LEFT INTERNAL DIMENSION IN SYSTOLE: 2.53 CM (ref 2.1–4)
LEFT VENTRICLE DIASTOLIC VOLUME: 94.02 ML
LEFT VENTRICLE SYSTOLIC VOLUME: 22.96 ML
LEFT VENTRICULAR INTERNAL DIMENSION IN DIASTOLE: 4.53 CM (ref 3.5–6)
LEFT VENTRICULAR MASS: 104.77 G
LV LATERAL E/E' RATIO: 9.22 M/S
LV SEPTAL E/E' RATIO: 13.83 M/S
LVOT MG: 1.88 MMHG
LVOT MV: 0.64 CM/S
MV PEAK A VEL: 0.54 M/S
MV PEAK E VEL: 0.83 M/S
MV STENOSIS PRESSURE HALF TIME: 63.39 MS
MV VALVE AREA P 1/2 METHOD: 3.47 CM2
PISA TR MAX VEL: 1.99 M/S
PULM VEIN S/D RATIO: 1.04
PV PEAK D VEL: 0.57 M/S
PV PEAK S VEL: 0.59 M/S
PV PEAK VELOCITY: 0.69 CM/S
RA MAJOR: 4.45 CM
RA PRESSURE: 3 MMHG
RA WIDTH: 3.68 CM
RIGHT VENTRICULAR END-DIASTOLIC DIMENSION: 4.96 CM
SINUS: 2.95 CM
STJ: 2.66 CM
TDI LATERAL: 0.09 M/S
TDI SEPTAL: 0.06 M/S
TDI: 0.08 M/S
TR MAX PG: 16 MMHG
TRICUSPID ANNULAR PLANE SYSTOLIC EXCURSION: 2.46 CM
TV PEAK GRADIENT: 1.28 MMHG
TV REST PULMONARY ARTERY PRESSURE: 19 MMHG

## 2022-12-19 PROCEDURE — 93306 ECHO (CUPID ONLY): ICD-10-PCS | Mod: 26,,, | Performed by: INTERNAL MEDICINE

## 2022-12-19 PROCEDURE — 93306 TTE W/DOPPLER COMPLETE: CPT

## 2022-12-19 PROCEDURE — 93227 XTRNL ECG REC<48 HR R&I: CPT | Mod: ,,, | Performed by: INTERNAL MEDICINE

## 2022-12-19 PROCEDURE — 93226 XTRNL ECG REC<48 HR SCAN A/R: CPT

## 2022-12-19 PROCEDURE — 93306 TTE W/DOPPLER COMPLETE: CPT | Mod: 26,,, | Performed by: INTERNAL MEDICINE

## 2022-12-19 PROCEDURE — 93227 HOLTER MONITOR - 24 HOUR (CUPID ONLY): ICD-10-PCS | Mod: ,,, | Performed by: INTERNAL MEDICINE

## 2022-12-22 LAB
OHS CV EVENT MONITOR DAY: 0
OHS CV HOLTER LENGTH DECIMAL HOURS: 23.98
OHS CV HOLTER LENGTH HOURS: 23
OHS CV HOLTER LENGTH MINUTES: 59
OHS CV HOLTER SINUS AVERAGE HR: 63
OHS CV HOLTER SINUS MAX HR: 94
OHS CV HOLTER SINUS MIN HR: 45

## 2022-12-27 ENCOUNTER — TELEPHONE (OUTPATIENT)
Dept: PRIMARY CARE CLINIC | Facility: CLINIC | Age: 62
End: 2022-12-27
Payer: COMMERCIAL

## 2022-12-27 NOTE — TELEPHONE ENCOUNTER
----- Message from Dwain Santos sent at 12/27/2022  9:45 AM CST -----  Contact: [phone]  Pt was very rude and hung up in my face cause she couldn't talk to someone in the office about her results.

## 2022-12-27 NOTE — TELEPHONE ENCOUNTER
----- Message from Karla Cartagena sent at 12/27/2022 10:07 AM CST -----  Contact: [phone]  Patient is returning a phone call.  Who left a message for the patient: Katy Cullen MA     Does patient know what this is regarding:  yes   Would you like a call back, or a response through your [facility] portal?:   call back   Comments:

## 2022-12-27 NOTE — TELEPHONE ENCOUNTER
Called pt regarding message. Pt requested appt to discuss ECHO results. Informed pt she have a upcoming appt on 01/13 with PCP. Pt verbalized understanding. Pt stated she will speak to him then.

## 2022-12-30 ENCOUNTER — PATIENT MESSAGE (OUTPATIENT)
Dept: PRIMARY CARE CLINIC | Facility: CLINIC | Age: 62
End: 2022-12-30
Payer: COMMERCIAL

## 2023-01-05 ENCOUNTER — OFFICE VISIT (OUTPATIENT)
Dept: CARDIOLOGY | Facility: CLINIC | Age: 63
End: 2023-01-05
Payer: COMMERCIAL

## 2023-01-05 VITALS
HEIGHT: 64 IN | BODY MASS INDEX: 18.82 KG/M2 | DIASTOLIC BLOOD PRESSURE: 72 MMHG | SYSTOLIC BLOOD PRESSURE: 137 MMHG | WEIGHT: 110.25 LBS | OXYGEN SATURATION: 100 % | HEART RATE: 58 BPM

## 2023-01-05 DIAGNOSIS — I49.3 PVC'S (PREMATURE VENTRICULAR CONTRACTIONS): ICD-10-CM

## 2023-01-05 DIAGNOSIS — R00.2 PALPITATIONS: Primary | ICD-10-CM

## 2023-01-05 DIAGNOSIS — I49.1 PREMATURE ATRIAL CONTRACTIONS: ICD-10-CM

## 2023-01-05 DIAGNOSIS — I47.10 SVT (SUPRAVENTRICULAR TACHYCARDIA): ICD-10-CM

## 2023-01-05 DIAGNOSIS — I34.0 MILD MITRAL INSUFFICIENCY: ICD-10-CM

## 2023-01-05 PROCEDURE — 3008F BODY MASS INDEX DOCD: CPT | Mod: CPTII,S$GLB,, | Performed by: INTERNAL MEDICINE

## 2023-01-05 PROCEDURE — 1160F PR REVIEW ALL MEDS BY PRESCRIBER/CLIN PHARMACIST DOCUMENTED: ICD-10-PCS | Mod: CPTII,S$GLB,, | Performed by: INTERNAL MEDICINE

## 2023-01-05 PROCEDURE — 3078F PR MOST RECENT DIASTOLIC BLOOD PRESSURE < 80 MM HG: ICD-10-PCS | Mod: CPTII,S$GLB,, | Performed by: INTERNAL MEDICINE

## 2023-01-05 PROCEDURE — 99999 PR PBB SHADOW E&M-EST. PATIENT-LVL III: ICD-10-PCS | Mod: PBBFAC,,, | Performed by: INTERNAL MEDICINE

## 2023-01-05 PROCEDURE — 99205 PR OFFICE/OUTPT VISIT, NEW, LEVL V, 60-74 MIN: ICD-10-PCS | Mod: S$GLB,,, | Performed by: INTERNAL MEDICINE

## 2023-01-05 PROCEDURE — 99205 OFFICE O/P NEW HI 60 MIN: CPT | Mod: S$GLB,,, | Performed by: INTERNAL MEDICINE

## 2023-01-05 PROCEDURE — 3075F PR MOST RECENT SYSTOLIC BLOOD PRESS GE 130-139MM HG: ICD-10-PCS | Mod: CPTII,S$GLB,, | Performed by: INTERNAL MEDICINE

## 2023-01-05 PROCEDURE — 1160F RVW MEDS BY RX/DR IN RCRD: CPT | Mod: CPTII,S$GLB,, | Performed by: INTERNAL MEDICINE

## 2023-01-05 PROCEDURE — 3075F SYST BP GE 130 - 139MM HG: CPT | Mod: CPTII,S$GLB,, | Performed by: INTERNAL MEDICINE

## 2023-01-05 PROCEDURE — 1159F PR MEDICATION LIST DOCUMENTED IN MEDICAL RECORD: ICD-10-PCS | Mod: CPTII,S$GLB,, | Performed by: INTERNAL MEDICINE

## 2023-01-05 PROCEDURE — 3078F DIAST BP <80 MM HG: CPT | Mod: CPTII,S$GLB,, | Performed by: INTERNAL MEDICINE

## 2023-01-05 PROCEDURE — 3008F PR BODY MASS INDEX (BMI) DOCUMENTED: ICD-10-PCS | Mod: CPTII,S$GLB,, | Performed by: INTERNAL MEDICINE

## 2023-01-05 PROCEDURE — 99999 PR PBB SHADOW E&M-EST. PATIENT-LVL III: CPT | Mod: PBBFAC,,, | Performed by: INTERNAL MEDICINE

## 2023-01-05 PROCEDURE — 1159F MED LIST DOCD IN RCRD: CPT | Mod: CPTII,S$GLB,, | Performed by: INTERNAL MEDICINE

## 2023-01-05 NOTE — PROGRESS NOTES
"  Subjective:      Patient ID: Lily Fraire is a 62 y.o. female.    Chief Complaint: Palpitations    HPI:  "I wake up 5 nights out of 7 with my heart fluttering"  The palpitations last for a few minutes.  Heart races but is not irregular.      Pt works 6 days a week.    Pt runs 2 minutes and walks 2 minutes       Review of Systems   Cardiovascular:  Positive for palpitations. Negative for chest pain, claudication, dyspnea on exertion, irregular heartbeat, leg swelling, near-syncope, orthopnea and syncope.      History reviewed. No pertinent past medical history.     Past Surgical History:   Procedure Laterality Date    BREAST SURGERY       SECTION      FACIAL COSMETIC SURGERY      HEMORRHOID SURGERY      HYSTERECTOMY      OOPHORECTOMY         Family History   Problem Relation Age of Onset    Hypertension Mother     Heart disease Mother     Heart attack Father     Stroke Father     Hypertension Father     Heart disease Father     No Known Problems Sister     Heart disease Brother     No Known Problems Daughter        Social History     Socioeconomic History    Marital status:    Tobacco Use    Smoking status: Never    Smokeless tobacco: Never   Substance and Sexual Activity    Alcohol use: Not Currently    Drug use: Not Currently    Sexual activity: Not Currently       Current Outpatient Medications on File Prior to Visit   Medication Sig Dispense Refill    estradioL (ESTRACE) 0.5 MG tablet Take 1 tablet (0.5 mg total) by mouth once daily. 90 tablet 3    prasterone, dhea, (INTRAROSA) 6.5 mg Inst Place 6.5 mg vaginally every evening. 30 each 11     No current facility-administered medications on file prior to visit.       Review of patient's allergies indicates:  No Known Allergies  Objective:     Vitals:    23 0726   BP: 137/72   BP Location: Left arm   Patient Position: Sitting   BP Method: Medium (Automatic)   Pulse: (!) 58   SpO2: 100%   Weight: 50 kg (110 lb 3.7 oz)   Height: 5' 4" (1.626 " m)        Physical Exam  Vitals reviewed.   Constitutional:       Appearance: She is well-developed.   Eyes:      General: No scleral icterus.  Neck:      Vascular: No carotid bruit or JVD.   Cardiovascular:      Rate and Rhythm: Normal rate and regular rhythm.      Pulses:           Dorsalis pedis pulses are 2+ on the right side and 2+ on the left side.        Posterior tibial pulses are 2+ on the right side and 2+ on the left side.      Heart sounds: No murmur heard.    No gallop.   Pulmonary:      Breath sounds: Normal breath sounds.   Abdominal:      General: Abdomen is flat. There is no abdominal bruit.      Palpations: Abdomen is soft. There is no shifting dullness, fluid wave, hepatomegaly, splenomegaly, mass or pulsatile mass.      Tenderness: There is no abdominal tenderness.   Musculoskeletal:      Right lower leg: No edema.      Left lower leg: No edema.   Skin:     General: Skin is warm and dry.   Neurological:      Mental Status: She is alert and oriented to person, place, and time.   Psychiatric:         Behavior: Behavior normal.         Thought Content: Thought content normal.         Judgment: Judgment normal.            ECG 12/12/22: NSR, WNL    Hospital Outpatient Visit on 12/19/2022   Component Date Value Ref Range Status    Holter length hours 12/19/2022 23   Final    holter length minutes 12/19/2022 59   Final    holter length dec hours 12/19/2022 23.98   Final    Sinus min HR 12/19/2022 45   Final    Sinus max hr 12/19/2022 94   Final    Sinus avg hr 12/19/2022 63   Final    Event Monitor Day 12/19/2022 0   Final   Hospital Outpatient Visit on 12/19/2022   Component Date Value Ref Range Status    TDI SEPTAL 12/19/2022 0.06  m/s Final    LV LATERAL E/E' RATIO 12/19/2022 9.22  m/s Final    LV SEPTAL E/E' RATIO 12/19/2022 13.83  m/s Final    IVC diameter 12/19/2022 1.16  cm Final    Left Ventricular Outflow Tract Raquel* 12/19/2022 0.64  cm/s Final    Left Ventricular Outflow Tract Raquel* 12/19/2022  1.88  mmHg Final    TDI LATERAL 12/19/2022 0.09  m/s Final    PV PEAK VELOCITY 12/19/2022 0.69  cm/s Final    LVIDd 12/19/2022 4.53  3.5 - 6.0 cm Final    IVS 12/19/2022 0.70  0.6 - 1.1 cm Final    Posterior Wall 12/19/2022 0.79  0.6 - 1.1 cm Final    LVIDs 12/19/2022 2.53  2.1 - 4.0 cm Final    FS 12/19/2022 44  28 - 44 % Final    Sinus 12/19/2022 2.95  cm Final    STJ 12/19/2022 2.66  cm Final    Ascending aorta 12/19/2022 3.17  cm Final    LV mass 12/19/2022 104.77  g Final    LA size 12/19/2022 2.71  cm Final    RVDD 12/19/2022 4.96  cm Final    TAPSE 12/19/2022 2.46  cm Final    Left Ventricle Relative Wall Thick* 12/19/2022 0.35  cm Final    AV mean gradient 12/19/2022 3  mmHg Final    AV valve area 12/19/2022 2.80  cm2 Final    AV Velocity Ratio 12/19/2022 0.81   Final    AV index (prosthetic) 12/19/2022 0.85   Final    MV valve area p 1/2 method 12/19/2022 3.47  cm2 Final    E/A ratio 12/19/2022 1.54   Final    Mean e' 12/19/2022 0.08  m/s Final    E wave deceleration time 12/19/2022 218.57  msec Final    IVRT 12/19/2022 125.59  msec Final    Pulm vein S/D ratio 12/19/2022 1.04   Final    LVOT diameter 12/19/2022 2.05  cm Final    LVOT area 12/19/2022 3.3  cm2 Final    LVOT peak akhil 12/19/2022 0.96  m/s Final    LVOT peak VTI 12/19/2022 23.60  cm Final    Ao peak akhil 12/19/2022 1.18  m/s Final    Ao VTI 12/19/2022 27.8  cm Final    LVOT stroke volume 12/19/2022 77.86  cm3 Final    AV peak gradient 12/19/2022 6  mmHg Final    TV peak gradient 12/19/2022 1.28  mmHg Final    E/E' ratio 12/19/2022 11.07  m/s Final    MV Peak E Akhil 12/19/2022 0.83  m/s Final    TR Max Akhil 12/19/2022 1.99  m/s Final    MV stenosis pressure 1/2 time 12/19/2022 63.39  ms Final    MV Peak A Akhil 12/19/2022 0.54  m/s Final    PV Peak S Akhil 12/19/2022 0.59  m/s Final    PV Peak D Akhil 12/19/2022 0.57  m/s Final    LV Systolic Volume 12/19/2022 22.96  mL Final    LV Diastolic Volume 12/19/2022 94.02  mL Final    RA Major Axis 12/19/2022  4.45  cm Final    Left Atrium Minor Axis 12/19/2022 4.59  cm Final    Triscuspid Valve Regurgitation Pea* 12/19/2022 16  mmHg Final    RA Width 12/19/2022 3.68  cm Final    LA WIDTH 12/19/2022 4.60  cm Final    LA volume 12/19/2022 48.69  cm3 Final    Left Atrium Major Axis 12/19/2022 4.60  cm Final    Right Atrial Pressure (from IVC) 12/19/2022 3  mmHg Final    EF 12/19/2022 55  % Final    TV rest pulmonary artery pressure 12/19/2022 19  mmHg Final   Lab Visit on 12/12/2022   Component Date Value Ref Range Status    WBC 12/12/2022 4.55  3.90 - 12.70 K/uL Final    RBC 12/12/2022 4.41  4.00 - 5.40 M/uL Final    Hemoglobin 12/12/2022 13.3  12.0 - 16.0 g/dL Final    Hematocrit 12/12/2022 40.1  37.0 - 48.5 % Final    MCV 12/12/2022 91  82 - 98 fL Final    MCH 12/12/2022 30.2  27.0 - 31.0 pg Final    MCHC 12/12/2022 33.2  32.0 - 36.0 g/dL Final    RDW 12/12/2022 13.4  11.5 - 14.5 % Final    Platelets 12/12/2022 265  150 - 450 K/uL Final    MPV 12/12/2022 9.2  9.2 - 12.9 fL Final    Immature Granulocytes 12/12/2022 0.2  0.0 - 0.5 % Final    Gran # (ANC) 12/12/2022 2.2  1.8 - 7.7 K/uL Final    Immature Grans (Abs) 12/12/2022 0.01  0.00 - 0.04 K/uL Final    Lymph # 12/12/2022 1.9  1.0 - 4.8 K/uL Final    Mono # 12/12/2022 0.3  0.3 - 1.0 K/uL Final    Eos # 12/12/2022 0.1  0.0 - 0.5 K/uL Final    Baso # 12/12/2022 0.02  0.00 - 0.20 K/uL Final    nRBC 12/12/2022 0  0 /100 WBC Final    Gran % 12/12/2022 47.5  38.0 - 73.0 % Final    Lymph % 12/12/2022 42.4  18.0 - 48.0 % Final    Mono % 12/12/2022 7.5  4.0 - 15.0 % Final    Eosinophil % 12/12/2022 2.0  0.0 - 8.0 % Final    Basophil % 12/12/2022 0.4  0.0 - 1.9 % Final    Differential Method 12/12/2022 Automated   Final    Sodium 12/12/2022 140  136 - 145 mmol/L Final    Potassium 12/12/2022 4.2  3.5 - 5.1 mmol/L Final    Chloride 12/12/2022 103  95 - 110 mmol/L Final    CO2 12/12/2022 27  23 - 29 mmol/L Final    Glucose 12/12/2022 91  70 - 110 mg/dL Final    BUN 12/12/2022 16  8  - 23 mg/dL Final    Creatinine 12/12/2022 0.7  0.5 - 1.4 mg/dL Final    Calcium 12/12/2022 10.1  8.7 - 10.5 mg/dL Final    Total Protein 12/12/2022 7.1  6.0 - 8.4 g/dL Final    Albumin 12/12/2022 4.2  3.5 - 5.2 g/dL Final    Total Bilirubin 12/12/2022 0.5  0.1 - 1.0 mg/dL Final    Alkaline Phosphatase 12/12/2022 61  55 - 135 U/L Final    AST 12/12/2022 22  10 - 40 U/L Final    ALT 12/12/2022 16  10 - 44 U/L Final    Anion Gap 12/12/2022 10  8 - 16 mmol/L Final    eGFR 12/12/2022 >60.0  >60 mL/min/1.73 m^2 Final    TSH 12/12/2022 1.353  0.400 - 4.000 uIU/mL Final    Ferritin 12/12/2022 58  20.0 - 300.0 ng/mL Final    Iron 12/12/2022 89  30 - 160 ug/dL Final    Transferrin 12/12/2022 284  200 - 375 mg/dL Final    TIBC 12/12/2022 420  250 - 450 ug/dL Final    Saturated Iron 12/12/2022 21  20 - 50 % Final    Vitamin B-12 12/12/2022 696  210 - 950 pg/mL Final    Folate 12/12/2022 15.8  4.0 - 24.0 ng/mL Final    Magnesium 12/12/2022 2.1  1.6 - 2.6 mg/dL Final   (   Chol HDL LDL CHOLc) TG   01/16/22 0744 213 Important  88 Important  116.0 45   12/24/20 1000         Accession #: [id number]  Lily WILLIS Juno  Holter monitor - 24 hour  Order# [id number]  Reading physician: Adelso Estevez MD Ordering physician: Melquiades Parry MD Study date: 12/19/22     Reason for Exam  Priority: Routine  Dx: Palpitations [R00.2 (ICD-10-CM)]; SOB (shortness of breath) [R06.02 (ICD-10-CM)]     Conclusion    Sinus rhythm with heart rates varying between 45 and 94 BPM with an average of 63 BPM.  There were very rare PVCs totalling 1  There were very rare PACs totalling 55 and averaging 2.29 per hour.  One 3 beat SVT  Symptoms correlated to NSR     Summary    The left ventricle is normal in size with normal systolic function.  The estimated ejection fraction is 55%.  Normal left ventricular diastolic function.  Normal right ventricular size with normal right ventricular systolic function.  Mild left atrial enlargement.  Mild right atrial  enlargement.  Mild mitral regurgitation.  Mild tricuspid regurgitation.  Mild pulmonic regurgitation.  Normal central venous pressure (3 mmHg).  The estimated PA systolic pressure is 19 mmHg.  Trivial posterior pericardial effusion.       Assessment:     1. Palpitations    2. Premature atrial contractions    3. SVT (supraventricular tachycardia)    4. PVC's (premature ventricular contractions)    5. Mild mitral insufficiency      Plan:   Lily was seen today for palpitations.    Diagnoses and all orders for this visit:    Palpitations    Premature atrial contractions    SVT (supraventricular tachycardia)    PVC's (premature ventricular contractions)    Mild mitral insufficiency     Pt instructed to discontinue her occasional use of pseudoephedrine and to reduce her consumption of caffeine    Pt may well be experiencing episodes of paroxysmal atrial tachycardia.  Discussed with pt how definitive diagnosis requires an ECG tracing during the palpitations.  Alternatively pt may be experiencing sinus tachycardia associated with vivid dreams.  Discussed 2 week event monitor vs.[employer] mobile device.  Discussed empiric trial of low dose metoprolol hs.  Pt wishes to avoid meds and will purchase a [employer] mobile    Pt reassured that her heart is structurally normal and that the mild mitral and tricuspid and pulmonic regurgitation are not clinically significant.      Follow up in about 4 weeks (around 2/2/2023).

## 2023-02-06 ENCOUNTER — OFFICE VISIT (OUTPATIENT)
Dept: CARDIOLOGY | Facility: CLINIC | Age: 63
End: 2023-02-06
Payer: COMMERCIAL

## 2023-02-06 VITALS
HEIGHT: 64 IN | WEIGHT: 111.88 LBS | SYSTOLIC BLOOD PRESSURE: 128 MMHG | HEART RATE: 60 BPM | BODY MASS INDEX: 19.1 KG/M2 | OXYGEN SATURATION: 99 % | DIASTOLIC BLOOD PRESSURE: 71 MMHG

## 2023-02-06 DIAGNOSIS — I49.1 PREMATURE ATRIAL CONTRACTIONS: Primary | ICD-10-CM

## 2023-02-06 DIAGNOSIS — R00.2 PALPITATIONS: ICD-10-CM

## 2023-02-06 PROCEDURE — 99213 PR OFFICE/OUTPT VISIT, EST, LEVL III, 20-29 MIN: ICD-10-PCS | Mod: S$GLB,,, | Performed by: INTERNAL MEDICINE

## 2023-02-06 PROCEDURE — 3074F PR MOST RECENT SYSTOLIC BLOOD PRESSURE < 130 MM HG: ICD-10-PCS | Mod: CPTII,S$GLB,, | Performed by: INTERNAL MEDICINE

## 2023-02-06 PROCEDURE — 3078F DIAST BP <80 MM HG: CPT | Mod: CPTII,S$GLB,, | Performed by: INTERNAL MEDICINE

## 2023-02-06 PROCEDURE — 3008F BODY MASS INDEX DOCD: CPT | Mod: CPTII,S$GLB,, | Performed by: INTERNAL MEDICINE

## 2023-02-06 PROCEDURE — 1159F MED LIST DOCD IN RCRD: CPT | Mod: CPTII,S$GLB,, | Performed by: INTERNAL MEDICINE

## 2023-02-06 PROCEDURE — 99213 OFFICE O/P EST LOW 20 MIN: CPT | Mod: S$GLB,,, | Performed by: INTERNAL MEDICINE

## 2023-02-06 PROCEDURE — 1160F PR REVIEW ALL MEDS BY PRESCRIBER/CLIN PHARMACIST DOCUMENTED: ICD-10-PCS | Mod: CPTII,S$GLB,, | Performed by: INTERNAL MEDICINE

## 2023-02-06 PROCEDURE — 3078F PR MOST RECENT DIASTOLIC BLOOD PRESSURE < 80 MM HG: ICD-10-PCS | Mod: CPTII,S$GLB,, | Performed by: INTERNAL MEDICINE

## 2023-02-06 PROCEDURE — 1159F PR MEDICATION LIST DOCUMENTED IN MEDICAL RECORD: ICD-10-PCS | Mod: CPTII,S$GLB,, | Performed by: INTERNAL MEDICINE

## 2023-02-06 PROCEDURE — 3074F SYST BP LT 130 MM HG: CPT | Mod: CPTII,S$GLB,, | Performed by: INTERNAL MEDICINE

## 2023-02-06 PROCEDURE — 3008F PR BODY MASS INDEX (BMI) DOCUMENTED: ICD-10-PCS | Mod: CPTII,S$GLB,, | Performed by: INTERNAL MEDICINE

## 2023-02-06 PROCEDURE — 99999 PR PBB SHADOW E&M-EST. PATIENT-LVL III: ICD-10-PCS | Mod: PBBFAC,,, | Performed by: INTERNAL MEDICINE

## 2023-02-06 PROCEDURE — 1160F RVW MEDS BY RX/DR IN RCRD: CPT | Mod: CPTII,S$GLB,, | Performed by: INTERNAL MEDICINE

## 2023-02-06 PROCEDURE — 99999 PR PBB SHADOW E&M-EST. PATIENT-LVL III: CPT | Mod: PBBFAC,,, | Performed by: INTERNAL MEDICINE

## 2023-02-06 RX ORDER — EFINACONAZOLE 100 MG/ML
SOLUTION TOPICAL NIGHTLY
COMMUNITY
Start: 2023-01-05

## 2023-02-06 NOTE — PROGRESS NOTES
Subjective:      Patient ID: Lily Fraire is a 62 y.o. female.    Chief Complaint: Follow-up and Palpitations    HPI:  Pt has been feeling better over the past week.    Pt has multiple Kardia strips which all show normal sinus rhythm with no ectopy.    Stress level at work is improved ([employer])    Review of Systems   Cardiovascular:  Positive for palpitations. Negative for chest pain, claudication, dyspnea on exertion, irregular heartbeat, leg swelling, near-syncope, orthopnea and syncope.      No past medical history on file.     Past Surgical History:   Procedure Laterality Date    BREAST SURGERY       SECTION      FACIAL COSMETIC SURGERY      HEMORRHOID SURGERY      HYSTERECTOMY      OOPHORECTOMY         Family History   Problem Relation Age of Onset    Hypertension Mother     Heart disease Mother     Heart attack Father     Stroke Father     Hypertension Father     Heart disease Father     No Known Problems Sister     Heart disease Brother     No Known Problems Daughter        Social History     Socioeconomic History    Marital status:    Tobacco Use    Smoking status: Never    Smokeless tobacco: Never   Substance and Sexual Activity    Alcohol use: Not Currently    Drug use: Not Currently    Sexual activity: Not Currently       Current Outpatient Medications on File Prior to Visit   Medication Sig Dispense Refill    estradioL (ESTRACE) 0.5 MG tablet Take 1 tablet (0.5 mg total) by mouth once daily. 90 tablet 3    JUBLIA 10 % Jossie Apply topically every evening.      prasterone, dhea, (INTRAROSA) 6.5 mg Inst Place 6.5 mg vaginally every evening. 30 each 11     No current facility-administered medications on file prior to visit.       Review of patient's allergies indicates:  No Known Allergies  Objective:     Vitals:    23 0715   BP: 128/71   BP Location: Left arm   Patient Position: Sitting   BP Method: Medium (Automatic)   Pulse: 60   SpO2: 99%   Weight: 50.8 kg  "(111 lb 14.1 oz)   Height: 5' 4" (1.626 m)        Physical Exam  Vitals reviewed.   Constitutional:       Appearance: She is well-developed.   Eyes:      General: No scleral icterus.  Neck:      Vascular: No carotid bruit or JVD.   Cardiovascular:      Rate and Rhythm: Normal rate and regular rhythm.      Heart sounds: No murmur heard.    No gallop.   Pulmonary:      Breath sounds: Normal breath sounds.   Musculoskeletal:      Right lower leg: No edema.      Left lower leg: No edema.   Skin:     General: Skin is warm and dry.   Neurological:      Mental Status: She is alert and oriented to person, place, and time.   Psychiatric:         Behavior: Behavior normal.         Thought Content: Thought content normal.         Judgment: Judgment normal.        Assessment:     1. Premature atrial contractions    2. Palpitations      Plan:   Lily was seen today for follow-up and palpitations.    Diagnoses and all orders for this visit:    Premature atrial contractions    Palpitations     Pt is doing better    Pt reassured that all of her Kardia strips are normal    Pt may have symptomatic PAC's or nonsustained ectopic atrial tachycardia but will withhold empiric beta blocker since all the ECG's do not show any significant dysrhythmia and since the palpitations have improved since stress at work is better and since pt has stopped caffeine and has given up decongestants      F/u with Dr Parry    RTC prn recurrent palpitations.    No follow-ups on file.    "

## 2023-04-21 ENCOUNTER — PATIENT MESSAGE (OUTPATIENT)
Dept: ADMINISTRATIVE | Facility: HOSPITAL | Age: 63
End: 2023-04-21
Payer: COMMERCIAL

## 2023-07-05 ENCOUNTER — OFFICE VISIT (OUTPATIENT)
Dept: OBSTETRICS AND GYNECOLOGY | Facility: CLINIC | Age: 63
End: 2023-07-05
Payer: COMMERCIAL

## 2023-07-05 VITALS
SYSTOLIC BLOOD PRESSURE: 118 MMHG | WEIGHT: 114.63 LBS | HEIGHT: 64 IN | BODY MASS INDEX: 19.57 KG/M2 | DIASTOLIC BLOOD PRESSURE: 72 MMHG

## 2023-07-05 DIAGNOSIS — Z01.419 WELL WOMAN EXAM WITH ROUTINE GYNECOLOGICAL EXAM: Primary | ICD-10-CM

## 2023-07-05 DIAGNOSIS — N95.2 POST-MENOPAUSAL ATROPHIC VAGINITIS: ICD-10-CM

## 2023-07-05 DIAGNOSIS — Z79.890 POST-MENOPAUSE ON HRT (HORMONE REPLACEMENT THERAPY): ICD-10-CM

## 2023-07-05 DIAGNOSIS — Z12.31 VISIT FOR SCREENING MAMMOGRAM: ICD-10-CM

## 2023-07-05 DIAGNOSIS — Z13.820 OSTEOPOROSIS SCREENING: ICD-10-CM

## 2023-07-05 PROCEDURE — 1159F MED LIST DOCD IN RCRD: CPT | Mod: CPTII,S$GLB,, | Performed by: NURSE PRACTITIONER

## 2023-07-05 PROCEDURE — 99999 PR PBB SHADOW E&M-EST. PATIENT-LVL III: ICD-10-PCS | Mod: PBBFAC,,, | Performed by: NURSE PRACTITIONER

## 2023-07-05 PROCEDURE — 3288F PR FALLS RISK ASSESSMENT DOCUMENTED: ICD-10-PCS | Mod: CPTII,S$GLB,, | Performed by: NURSE PRACTITIONER

## 2023-07-05 PROCEDURE — 3078F DIAST BP <80 MM HG: CPT | Mod: CPTII,S$GLB,, | Performed by: NURSE PRACTITIONER

## 2023-07-05 PROCEDURE — 1159F PR MEDICATION LIST DOCUMENTED IN MEDICAL RECORD: ICD-10-PCS | Mod: CPTII,S$GLB,, | Performed by: NURSE PRACTITIONER

## 2023-07-05 PROCEDURE — 3074F SYST BP LT 130 MM HG: CPT | Mod: CPTII,S$GLB,, | Performed by: NURSE PRACTITIONER

## 2023-07-05 PROCEDURE — 3078F PR MOST RECENT DIASTOLIC BLOOD PRESSURE < 80 MM HG: ICD-10-PCS | Mod: CPTII,S$GLB,, | Performed by: NURSE PRACTITIONER

## 2023-07-05 PROCEDURE — 99999 PR PBB SHADOW E&M-EST. PATIENT-LVL III: CPT | Mod: PBBFAC,,, | Performed by: NURSE PRACTITIONER

## 2023-07-05 PROCEDURE — 3074F PR MOST RECENT SYSTOLIC BLOOD PRESSURE < 130 MM HG: ICD-10-PCS | Mod: CPTII,S$GLB,, | Performed by: NURSE PRACTITIONER

## 2023-07-05 PROCEDURE — 3288F FALL RISK ASSESSMENT DOCD: CPT | Mod: CPTII,S$GLB,, | Performed by: NURSE PRACTITIONER

## 2023-07-05 PROCEDURE — 1101F PT FALLS ASSESS-DOCD LE1/YR: CPT | Mod: CPTII,S$GLB,, | Performed by: NURSE PRACTITIONER

## 2023-07-05 PROCEDURE — 99397 PER PM REEVAL EST PAT 65+ YR: CPT | Mod: S$GLB,,, | Performed by: NURSE PRACTITIONER

## 2023-07-05 PROCEDURE — 1101F PR PT FALLS ASSESS DOC 0-1 FALLS W/OUT INJ PAST YR: ICD-10-PCS | Mod: CPTII,S$GLB,, | Performed by: NURSE PRACTITIONER

## 2023-07-05 PROCEDURE — 3008F BODY MASS INDEX DOCD: CPT | Mod: CPTII,S$GLB,, | Performed by: NURSE PRACTITIONER

## 2023-07-05 PROCEDURE — 3008F PR BODY MASS INDEX (BMI) DOCUMENTED: ICD-10-PCS | Mod: CPTII,S$GLB,, | Performed by: NURSE PRACTITIONER

## 2023-07-05 PROCEDURE — 99397 PR PREVENTIVE VISIT,EST,65 & OVER: ICD-10-PCS | Mod: S$GLB,,, | Performed by: NURSE PRACTITIONER

## 2023-07-05 RX ORDER — ESTRADIOL 0.1 MG/G
1 CREAM VAGINAL
Qty: 42.5 G | Refills: 3 | Status: SHIPPED | OUTPATIENT
Start: 2023-07-06 | End: 2024-07-05

## 2023-07-05 RX ORDER — ESTRADIOL 0.5 MG/1
0.5 TABLET ORAL DAILY
Qty: 90 TABLET | Refills: 3 | Status: SHIPPED | OUTPATIENT
Start: 2023-07-05

## 2023-07-05 NOTE — PROGRESS NOTES
CC: Annual  HPI: Pt is a 63 y.o.  female who presents for routine annual exam. She is postmenopausal and on HRT- happy with current regimen of estrace 0.5 mg daily. She has been on this for years since her hysterectomy. Recently switched to medicare- became active on . Likes the intrarosa but knows it will not be covered- interested in alternatives. Not currently sexually active but would prefer to continue with treatment. Needs bone scan updated- has one normal scan 10+ years ago. Still working. No other issues.     Notes from last [facility]we visit in  with me-  CC: Annual  HPI: Pt is a 62 y.o.  female who presents for routine annual exam. Last saw me in - see notes below. She is postmenopausal and is on HRT- on po estrace (no uterus). Requesting refills of intrarosa and estrace. Does not want a pelvic exam today. Lives in [place]- daughter is a pharmacist.     FH:   Breast cancer: none  Colon cancer: none  Ovarian cancer: none  Uterine cancer: none    HPV vaccine: na  Last pap smear: na  History of abnormal pap smears:  no    Colonoscopy: current- negative @ age 58  DEXA: due  Mammogram: due  STD history: no  Birth control: na  OB history:   Tobacco use: no    ROS:  GENERAL: Feeling well overall. Denies fever or chills.   SKIN: Denies rash or lesions.   HEAD: Denies head injury or headache.   NODES: Denies enlarged lymph nodes.   CHEST: Denies chest pain or shortness of breath.   CARDIOVASCULAR: Denies palpitations or left sided chest pain.   ABDOMEN: No abdominal pain, constipation, diarrhea, nausea, vomiting or rectal bleeding.   URINARY: No dysuria, hematuria, or burning on urination.  REPRODUCTIVE: See HPI.   BREASTS: Denies pain, lumps, or nipple discharge.   HEMATOLOGIC: No easy bruisability or excessive bleeding.   MUSCULOSKELETAL: Denies joint pain or swelling.   NEUROLOGIC: Denies syncope or weakness.   PSYCHIATRIC: Denies depression, anxiety or mood swings.    PE:   APPEARANCE: Well  nourished, well developed, White female in no acute distress.  NODES: no cervical, supraclavicular, or inguinal lymphadenopathy  BREASTS: Symmetrical, no skin changes or visible lesions. No palpable masses, nipple discharge or adenopathy bilaterally.  ABDOMEN: Soft. No tenderness or masses. No distention. No hernias palpated. No CVA tenderness.  VULVA: No lesions. Normal external female genitalia.  URETHRAL MEATUS: Normal size and location, no lesions, no prolapse.  URETHRA: No masses, tenderness, or prolapse.  VAGINA: atrophic No lesions or lacerations noted. No abnormal discharge present. No odor present.   CERVIX: surgically absent  UTERUS: surgically absent  ADNEXA: surgically absent  ANUS PERINEUM: Normal.      Diagnosis:  1. Well woman exam with routine gynecological exam    2. Visit for screening mammogram    3. Osteoporosis screening    4. Post-menopausal atrophic vaginitis    5. Post-menopause on HRT (hormone replacement therapy)        Plan:     Orders Placed This Encounter    Mammo Digital Screening Bilat w/ Jaskaran    DXA Bone Density Axial Skeleton 1 or more sites    estradioL (ESTRACE) 0.5 MG tablet    estradioL (ESTRACE) 0.01 % (0.1 mg/gram) vaginal cream     Scheduled mammogram  Update DXA  C/w oral estrace  Rx estrace cream to take place of intrarosa  Cscopy current    Patient was counseled today on the new ACS guidelines for cervical cytology screening as well as the current recommendations for breast cancer screening. She was counseled to follow up with her PCP for other routine health maintenance. Counseling session lasted approximately 10 minutes, and all her questions were answered.  For women over the age of 65, you can stop having cervical cancer screenings if you have never had abnormal cervical cells or cervical cancer, and youve had three negative Pap tests in a row. (You also can stop screening if youve had two negative Pap and HPV tests in a row in the past 10 years, with at least one  test in the past 5 years.),    Follow-up with me in 1 year for routine exam    I spent a total of 30 minutes on the day of the visit.This includes face to face time and non-face to face time preparing to see the patient (eg, review of tests), obtaining and/or reviewing separately obtained history, documenting clinical information in the electronic or other health record, independently interpreting results and communicating results to the patient/family/caregiver, or care coordinator.    As of April 1, 2021, the Cures Act has been passed nationally. This new law requires that all doctors progress notes, lab results, pathology reports and radiology reports be released IMMEDIATELY to the patient in the patient portal. That means that the results are released to you at the EXACT same time they are released to me. Therefore, with all of the patients that I have I am not able to reply to each patient exactly when the results come in. So there will be a delay from when you see the results to when I see them and have time to come up with a response to send you. Also I only see these results when I am on the computer at work. So if the results come in over the weekend or after 5 pm of a work day, I will not see them until the next business day. As you can tell, this is a challenge as a provider to give every patient the quick response they hope for and deserve. So please be patient!     Thanks for your understanding and patience.

## 2023-07-17 ENCOUNTER — HOSPITAL ENCOUNTER (OUTPATIENT)
Dept: RADIOLOGY | Facility: HOSPITAL | Age: 63
Discharge: HOME | End: 2023-07-17
Attending: NURSE PRACTITIONER
Payer: COMMERCIAL

## 2023-07-17 DIAGNOSIS — Z12.31 VISIT FOR SCREENING MAMMOGRAM: ICD-10-CM

## 2023-07-17 PROCEDURE — 77063 BREAST TOMOSYNTHESIS BI: CPT | Mod: 26,,, | Performed by: RADIOLOGY

## 2023-07-17 PROCEDURE — 77067 SCR MAMMO BI INCL CAD: CPT | Mod: 26,,, | Performed by: RADIOLOGY

## 2023-07-17 PROCEDURE — 77067 SCR MAMMO BI INCL CAD: CPT | Mod: TC

## 2023-07-17 PROCEDURE — 77063 MAMMO DIGITAL SCREENING BILAT WITH TOMO: ICD-10-PCS | Mod: 26,,, | Performed by: RADIOLOGY

## 2023-07-17 PROCEDURE — 77067 MAMMO DIGITAL SCREENING BILAT WITH TOMO: ICD-10-PCS | Mod: 26,,, | Performed by: RADIOLOGY

## 2023-09-18 ENCOUNTER — PATIENT MESSAGE (OUTPATIENT)
Dept: PRIMARY CARE CLINIC | Facility: CLINIC | Age: 63
End: 2023-09-18
Payer: MEDICARE

## 2023-10-04 ENCOUNTER — PATIENT OUTREACH (OUTPATIENT)
Dept: ADMINISTRATIVE | Facility: HOSPITAL | Age: 63
End: 2023-10-04
Payer: MEDICARE

## 2023-10-04 NOTE — PROGRESS NOTES
Health Maintenance Due   Topic Date Due    Colorectal Cancer Screening  Never done    COVID-19 Vaccine (4 - Moderna series) 01/02/2022    Lipid Panel  01/16/2023    Pneumococcal Vaccines (Age 65+) (1 - PCV) Never done    Influenza Vaccine (1) 09/01/2023     Immunizations - reviewed and updated   Care Everywhere - triggered   Care Teams - updated   Outreach - JUDE sent to Dr. JESUS Alegre for most recent colonoscopy record. Per chart, patient had cscope in 2017.

## 2023-10-04 NOTE — LETTER
AUTHORIZATION FOR RELEASE OF   CONFIDENTIAL INFORMATION    Dear Dr. Alegre,    We are seeing Lily Fraire, date of birth 1960, in the clinic at [facility]. Melquiades Parry MD is the patient's PCP. Lily Fraire has an outstanding lab/procedure at the time we reviewed her chart. In order to help keep her health information updated, she has authorized us to request the following medical record(s):        (  )  MAMMOGRAM                                      (X)  COLONOSCOPY      (  )  PAP SMEAR                                          (  )  OUTSIDE LAB RESULTS     (  )  DEXA SCAN                                          (  )  EYE EXAM            (  )  FOOT EXAM                                          (  )  ENTIRE RECORD     (  )  OUTSIDE IMMUNIZATIONS                 (  )  _______________       ATTN: KRIS      Please fax records to Melquiades Parry MD, [phone]     If you have any questions, please contact Kris at [phone].           Patient Name: Lily Fraire  : 1960  Patient Phone #: [phone]

## 2023-10-18 ENCOUNTER — PATIENT MESSAGE (OUTPATIENT)
Dept: CARDIOLOGY | Facility: CLINIC | Age: 63
End: 2023-10-18
Payer: MEDICARE

## 2023-12-08 ENCOUNTER — PATIENT OUTREACH (OUTPATIENT)
Dept: ADMINISTRATIVE | Facility: HOSPITAL | Age: 63
End: 2023-12-08
Payer: MEDICARE

## 2023-12-08 NOTE — PROGRESS NOTES
Health Maintenance Due   Topic Date Due    Colorectal Cancer Screening  Never done    RSV Vaccine (Age 60+ and Pregnant patients) (1 - 1-dose 60+ series) Never done    Lipid Panel  01/16/2023    Pneumococcal Vaccines (Age 65+) (1 - PCV) Never done    Influenza Vaccine (1) 09/01/2023    COVID-19 Vaccine (4 - 2023-24 season) 09/01/2023        Chart review done.   HM updated.   Immunizations reviewed & updated.   Care Everywhere updated.

## 2023-12-22 ENCOUNTER — PATIENT OUTREACH (OUTPATIENT)
Dept: ADMINISTRATIVE | Facility: HOSPITAL | Age: 63
End: 2023-12-22
Payer: MEDICARE

## 2023-12-22 ENCOUNTER — OFFICE VISIT (OUTPATIENT)
Dept: PRIMARY CARE CLINIC | Facility: CLINIC | Age: 63
End: 2023-12-22
Payer: MEDICARE

## 2023-12-22 VITALS
BODY MASS INDEX: 19.32 KG/M2 | TEMPERATURE: 98 F | WEIGHT: 113.19 LBS | RESPIRATION RATE: 16 BRPM | HEART RATE: 58 BPM | SYSTOLIC BLOOD PRESSURE: 124 MMHG | DIASTOLIC BLOOD PRESSURE: 82 MMHG | OXYGEN SATURATION: 98 % | HEIGHT: 64 IN

## 2023-12-22 DIAGNOSIS — E78.5 HYPERLIPIDEMIA, UNSPECIFIED HYPERLIPIDEMIA TYPE: ICD-10-CM

## 2023-12-22 DIAGNOSIS — I47.10 PSVT (PAROXYSMAL SUPRAVENTRICULAR TACHYCARDIA): ICD-10-CM

## 2023-12-22 DIAGNOSIS — Z79.890 POST-MENOPAUSE ON HRT (HORMONE REPLACEMENT THERAPY): ICD-10-CM

## 2023-12-22 DIAGNOSIS — Z23 NEED FOR VACCINATION: ICD-10-CM

## 2023-12-22 DIAGNOSIS — R53.83 FATIGUE, UNSPECIFIED TYPE: Primary | ICD-10-CM

## 2023-12-22 PROCEDURE — 3008F PR BODY MASS INDEX (BMI) DOCUMENTED: ICD-10-PCS | Mod: CPTII,S$GLB,, | Performed by: FAMILY MEDICINE

## 2023-12-22 PROCEDURE — 1159F PR MEDICATION LIST DOCUMENTED IN MEDICAL RECORD: ICD-10-PCS | Mod: CPTII,S$GLB,, | Performed by: FAMILY MEDICINE

## 2023-12-22 PROCEDURE — 3074F PR MOST RECENT SYSTOLIC BLOOD PRESSURE < 130 MM HG: ICD-10-PCS | Mod: CPTII,S$GLB,, | Performed by: FAMILY MEDICINE

## 2023-12-22 PROCEDURE — G0009 ADMIN PNEUMOCOCCAL VACCINE: HCPCS | Mod: S$GLB,,, | Performed by: FAMILY MEDICINE

## 2023-12-22 PROCEDURE — 90677 PNEUMOCOCCAL CONJUGATE VACCINE 20-VALENT: ICD-10-PCS | Mod: S$GLB,,, | Performed by: FAMILY MEDICINE

## 2023-12-22 PROCEDURE — 1160F PR REVIEW ALL MEDS BY PRESCRIBER/CLIN PHARMACIST DOCUMENTED: ICD-10-PCS | Mod: CPTII,S$GLB,, | Performed by: FAMILY MEDICINE

## 2023-12-22 PROCEDURE — 99999 PR PBB SHADOW E&M-EST. PATIENT-LVL III: ICD-10-PCS | Mod: PBBFAC,,, | Performed by: FAMILY MEDICINE

## 2023-12-22 PROCEDURE — 1101F PR PT FALLS ASSESS DOC 0-1 FALLS W/OUT INJ PAST YR: ICD-10-PCS | Mod: CPTII,S$GLB,, | Performed by: FAMILY MEDICINE

## 2023-12-22 PROCEDURE — 3079F DIAST BP 80-89 MM HG: CPT | Mod: CPTII,S$GLB,, | Performed by: FAMILY MEDICINE

## 2023-12-22 PROCEDURE — 3288F FALL RISK ASSESSMENT DOCD: CPT | Mod: CPTII,S$GLB,, | Performed by: FAMILY MEDICINE

## 2023-12-22 PROCEDURE — 1159F MED LIST DOCD IN RCRD: CPT | Mod: CPTII,S$GLB,, | Performed by: FAMILY MEDICINE

## 2023-12-22 PROCEDURE — 3074F SYST BP LT 130 MM HG: CPT | Mod: CPTII,S$GLB,, | Performed by: FAMILY MEDICINE

## 2023-12-22 PROCEDURE — 99214 OFFICE O/P EST MOD 30 MIN: CPT | Mod: 25,S$GLB,, | Performed by: FAMILY MEDICINE

## 2023-12-22 PROCEDURE — 1160F RVW MEDS BY RX/DR IN RCRD: CPT | Mod: CPTII,S$GLB,, | Performed by: FAMILY MEDICINE

## 2023-12-22 PROCEDURE — 90677 PCV20 VACCINE IM: CPT | Mod: S$GLB,,, | Performed by: FAMILY MEDICINE

## 2023-12-22 PROCEDURE — 1101F PT FALLS ASSESS-DOCD LE1/YR: CPT | Mod: CPTII,S$GLB,, | Performed by: FAMILY MEDICINE

## 2023-12-22 PROCEDURE — 3008F BODY MASS INDEX DOCD: CPT | Mod: CPTII,S$GLB,, | Performed by: FAMILY MEDICINE

## 2023-12-22 PROCEDURE — 3288F PR FALLS RISK ASSESSMENT DOCUMENTED: ICD-10-PCS | Mod: CPTII,S$GLB,, | Performed by: FAMILY MEDICINE

## 2023-12-22 PROCEDURE — 3079F PR MOST RECENT DIASTOLIC BLOOD PRESSURE 80-89 MM HG: ICD-10-PCS | Mod: CPTII,S$GLB,, | Performed by: FAMILY MEDICINE

## 2023-12-22 PROCEDURE — 99214 PR OFFICE/OUTPT VISIT, EST, LEVL IV, 30-39 MIN: ICD-10-PCS | Mod: 25,S$GLB,, | Performed by: FAMILY MEDICINE

## 2023-12-22 PROCEDURE — G0009 PNEUMOCOCCAL CONJUGATE VACCINE 20-VALENT: ICD-10-PCS | Mod: S$GLB,,, | Performed by: FAMILY MEDICINE

## 2023-12-22 PROCEDURE — 99999 PR PBB SHADOW E&M-EST. PATIENT-LVL III: CPT | Mod: PBBFAC,,, | Performed by: FAMILY MEDICINE

## 2023-12-22 NOTE — PROGRESS NOTES
"Subjective:       Patient ID: Lily Fraier is a 63 y.o. female.    Chief Complaint: Annual Exam    Lily Fraire is a 63 y.o. female seen today for a routine checkup. The patient has no specific complaints or concerns at this time except mild fatigue, but has been working long hours.  No recent illness or injury.  Compliant with all prescribed medications without adverse side effects.       Review of Systems   Constitutional:  Positive for fatigue. Negative for chills and fever.   HENT:  Negative for congestion.    Eyes:  Negative for visual disturbance.   Respiratory:  Negative for cough and shortness of breath.    Cardiovascular:  Negative for chest pain.   Gastrointestinal:  Positive for constipation. Negative for abdominal pain, blood in stool, nausea and vomiting.   Genitourinary:  Negative for difficulty urinating.   Musculoskeletal:  Negative for arthralgias.   Skin:  Negative for rash.   Allergic/Immunologic: Negative for immunocompromised state.   Neurological:  Negative for dizziness.   Hematological:  Does not bruise/bleed easily.   Psychiatric/Behavioral:  Negative for sleep disturbance.        Objective:      Vitals:    12/22/23 0850   BP: 124/82   BP Location: Right arm   Patient Position: Sitting   BP Method: Medium (Manual)   Pulse: (!) 58   Resp: 16   Temp: 97.7 °F (36.5 °C)   TempSrc: Temporal   SpO2: 98%   Weight: 51.4 kg (113 lb 3.3 oz)   Height: 5' 4" (1.626 m)     BP Readings from Last 5 Encounters:   12/22/23 124/82   07/05/23 118/72   02/06/23 128/71   01/05/23 137/72   12/12/22 120/62     Wt Readings from Last 5 Encounters:   12/22/23 51.4 kg (113 lb 3.3 oz)   07/05/23 52 kg (114 lb 10.2 oz)   02/06/23 50.8 kg (111 lb 14.1 oz)   01/05/23 50 kg (110 lb 3.7 oz)   12/12/22 49.5 kg (109 lb 2 oz)     Physical Exam    Lab Results   Component Value Date    WBC 4.55 12/12/2022    HGB 13.3 12/12/2022    HCT 40.1 12/12/2022     12/12/2022    CHOL 213 (H) 01/16/2022    TRIG 45 01/16/2022    " HDL 88 (H) 01/16/2022    ALT 16 12/12/2022    AST 22 12/12/2022     12/12/2022    K 4.2 12/12/2022     12/12/2022    CREATININE 0.7 12/12/2022    BUN 16 12/12/2022    CO2 27 12/12/2022    TSH 1.353 12/12/2022    INR 1.0 12/24/2020      Assessment:       1. Fatigue, unspecified type    2. PSVT (paroxysmal supraventricular tachycardia)    3. Post-menopause on HRT (hormone replacement therapy)    4. Hyperlipidemia, unspecified hyperlipidemia type    5. Need for vaccination        Plan:       Fatigue, unspecified type  -     CBC Auto Differential; Future; Expected date: 12/22/2023  -     TSH; Future; Expected date: 12/22/2023  Unclear etiology, will get labs  PSVT (paroxysmal supraventricular tachycardia)  Stable, no recent episodes  Post-menopause on HRT (hormone replacement therapy)    Hyperlipidemia, unspecified hyperlipidemia type  -     Comprehensive Metabolic Panel; Future; Expected date: 12/22/2023  -     Lipid Panel; Future; Expected date: 12/22/2023  Lifestyle modification  Need for vaccination  -     Pneumococcal conjugate vaccine 20 (PCV20) *Preferred* (PREVNAR 20) - (IM)      Medication List with Changes/Refills   Current Medications    ESTRADIOL (ESTRACE) 0.01 % (0.1 MG/GRAM) VAGINAL CREAM    Place 1 g vaginally twice a week. Use nightly for 2 weeks then twice weekly for maintenance    ESTRADIOL (ESTRACE) 0.5 MG TABLET    Take 1 tablet (0.5 mg total) by mouth once daily.    JUBLIA 10 % PENELOPE    Apply topically every evening.

## 2023-12-22 NOTE — PROGRESS NOTES
Health Maintenance Due   Topic Date Due    Colorectal Cancer Screening  Never done    Lipid Panel  2023     Population Health Chart Review & Patient Outreach Details      Further Action Needed If Patient Returns Outreach:      JUDE sent to Dr. Alegre for most recent colonoscopy records.       Updates Requested / Reviewed:     [x]  Care Everywhere    [x]     []  External Sources ([facility], [facility], [facility], etc.)    [] [facility]   [] [facility]   [] Other:    [x]  Care Team Updated   []  Removed  or Duplicate Orders   [x]  Immunization Reconciliation Completed / Queried    [x] [place]   [] [place]   [] [place]   [] [place]      Health Maintenance Topics Addressed and Outreach Outcomes / Actions Taken:             Breast Cancer Screening []  Mammogram Order Placed    []  Mammogram Screening Scheduled    []  External Records Requested & Care Team Updated if Applicable    []  External Records Uploaded & Care Team Updated if Applicable    []  Pt Declined Scheduling Mammogram    []  Pt Will Schedule with External Provider / Order Routed & Care Team Updated if Applicable              Cervical Cancer Screening []  Pap Smear Scheduled in Primary Care or OBGYN    []  External Records Requested & Care Team Updated if Applicable       []  External Records Uploaded, Care Team Updated, & History Updated if Applicable    []  Patient Declined Scheduling Pap Smear    []  Patient Will Schedule with External Provider & Care Team Updated if Applicable                  Colorectal Cancer Screening []  Colonoscopy Case Request / Referral / Home Test Order Placed    [x]  External Records Requested & Care Team Updated if Applicable    []  External Records Uploaded, Care Team Updated, & History Updated if Applicable    []  Patient Declined Completing Colon Cancer Screening    []  Patient Will Schedule with External Provider & Care Team Updated if Applicable    []  Fit Kit Mailed (add the SmartPhrase under additional notes)     []  Reminded Patient to Complete Home Test                Diabetic Eye Exam []  Eye Exam Screening Order Placed    []  Eye Camera Scheduled or Optometry/Ophthalmology Referral Placed    []  External Records Requested & Care Team Updated if Applicable    []  External Records Uploaded, Care Team Updated, & History Updated if Applicable    []  Patient Declined Scheduling Eye Exam    []  Patient Will Schedule with External Provider & Care Team Updated if Applicable             Blood Pressure Control []  Primary Care Follow Up Visit Scheduled     []  Remote Blood Pressure Reading Captured    []  Patient Declined Remote Reading or Scheduling Appt - Escalated to PCP    []  Patient Will Call Back or Send Portal Message with Reading                 HbA1c & Other Labs []  Overdue Lab(s) Ordered    []  Overdue Lab(s) Scheduled    []  External Records Uploaded & Care Team Updated if Applicable    []  Primary Care Follow Up Visit Scheduled     []  Reminded Patient to Complete A1c Home Test    []  Patient Declined Scheduling Labs or Will Call Back to Schedule    []  Patient Will Schedule with External Provider / Order Routed, & Care Team Updated if Applicable           Primary Care Appointment []  Primary Care Appt Scheduled    []  Patient Declined Scheduling or Will Call Back to Schedule    []  Pt Established with External Provider, Updated Care Team, & Informed Pt to Notify Payor if Applicable           Medication Adherence /    Statin Use []  Primary Care Appointment Scheduled    []  Patient Reminded to  Prescription    []  Patient Declined, Provider Notified if Needed    []  Sent Provider Message to Review to Evaluate Pt for Statin, Add Exclusion Dx Codes, Document   Exclusion in Problem List, Change Statin Intensity Level to Moderate or High Intensity if Applicable                Osteoporosis Screening []  Dexa Order Placed    []  Dexa Appointment Scheduled    []  External Records Requested & Care Team Updated     []  External Records Uploaded, Care Team Updated, & History Updated if Applicable    []  Patient Declined Scheduling Dexa or Will Call Back to Schedule    []  Patient Will Schedule with External Provider / Order Routed & Care Team Updated if Applicable       Additional Notes:

## 2023-12-22 NOTE — LETTER
AUTHORIZATION FOR RELEASE OF   CONFIDENTIAL INFORMATION    Dear Dr. Alegre,    We are seeing Lily Fraire, date of birth 1960, in the clinic at [facility]. Melquiades Parry MD is the patient's PCP. Lily Fraire has an outstanding lab/procedure at the time we reviewed her chart. In order to help keep her health information updated, she has authorized us to request the following medical record(s):        (  )  MAMMOGRAM                                      (X)  COLONOSCOPY      (  )  PAP SMEAR                                          (  )  OUTSIDE LAB RESULTS     (  )  DEXA SCAN                                          (  )  EYE EXAM            (  )  FOOT EXAM                                          (  )  ENTIRE RECORD     (  )  OUTSIDE IMMUNIZATIONS                 (  )  _______________       ATTN: KRIS      Please fax records to Melquiades Parry MD, [phone]     If you have any questions, please contact Kris at [phone].           Patient Name: Lily Fraire  : 1960  Patient Phone #: [phone]

## 2024-01-11 DIAGNOSIS — Z00.00 ENCOUNTER FOR MEDICARE ANNUAL WELLNESS EXAM: ICD-10-CM

## 2024-01-17 ENCOUNTER — PATIENT OUTREACH (OUTPATIENT)
Dept: ADMINISTRATIVE | Facility: HOSPITAL | Age: 64
End: 2024-01-17
Payer: MEDICARE

## 2024-01-17 NOTE — PROGRESS NOTES
Health Maintenance Due   Topic Date Due    Colorectal Cancer Screening  Never done     Population Health Chart Review & Patient Outreach Details      Further Action Needed If Patient Returns Outreach:      Manual fax sent to Dr. Alegre for most recent colonoscopy records.       Updates Requested / Reviewed:     [x]  Care Everywhere    [x]     []  External Sources ([facility], [facility], [facility], etc.)    [] [facility]   [] [facility]   [] Other:    [x]  Care Team Updated   []  Removed  or Duplicate Orders   [x]  Immunization Reconciliation Completed / Queried    [x] [place]   [] [place]   [] [place]   [] [place]      Health Maintenance Topics Addressed and Outreach Outcomes / Actions Taken:             Breast Cancer Screening []  Mammogram Order Placed    []  Mammogram Screening Scheduled    []  External Records Requested & Care Team Updated if Applicable    []  External Records Uploaded & Care Team Updated if Applicable    []  Pt Declined Scheduling Mammogram    []  Pt Will Schedule with External Provider / Order Routed & Care Team Updated if Applicable              Cervical Cancer Screening []  Pap Smear Scheduled in Primary Care or OBGYN    []  External Records Requested & Care Team Updated if Applicable       []  External Records Uploaded, Care Team Updated, & History Updated if Applicable    []  Patient Declined Scheduling Pap Smear    []  Patient Will Schedule with External Provider & Care Team Updated if Applicable                  Colorectal Cancer Screening []  Colonoscopy Case Request / Referral / Home Test Order Placed    [x]  External Records Requested & Care Team Updated if Applicable    []  External Records Uploaded, Care Team Updated, & History Updated if Applicable    []  Patient Declined Completing Colon Cancer Screening    []  Patient Will Schedule with External Provider & Care Team Updated if Applicable    []  Fit Kit Mailed (add the SmartPhrase under additional notes)    []  Reminded  Patient to Complete Home Test                Diabetic Eye Exam []  Eye Exam Screening Order Placed    []  Eye Camera Scheduled or Optometry/Ophthalmology Referral Placed    []  External Records Requested & Care Team Updated if Applicable    []  External Records Uploaded, Care Team Updated, & History Updated if Applicable    []  Patient Declined Scheduling Eye Exam    []  Patient Will Schedule with External Provider & Care Team Updated if Applicable             Blood Pressure Control []  Primary Care Follow Up Visit Scheduled     []  Remote Blood Pressure Reading Captured    []  Patient Declined Remote Reading or Scheduling Appt - Escalated to PCP    []  Patient Will Call Back or Send Portal Message with Reading                 HbA1c & Other Labs []  Overdue Lab(s) Ordered    []  Overdue Lab(s) Scheduled    []  External Records Uploaded & Care Team Updated if Applicable    []  Primary Care Follow Up Visit Scheduled     []  Reminded Patient to Complete A1c Home Test    []  Patient Declined Scheduling Labs or Will Call Back to Schedule    []  Patient Will Schedule with External Provider / Order Routed, & Care Team Updated if Applicable           Primary Care Appointment []  Primary Care Appt Scheduled    []  Patient Declined Scheduling or Will Call Back to Schedule    []  Pt Established with External Provider, Updated Care Team, & Informed Pt to Notify Payor if Applicable           Medication Adherence /    Statin Use []  Primary Care Appointment Scheduled    []  Patient Reminded to  Prescription    []  Patient Declined, Provider Notified if Needed    []  Sent Provider Message to Review to Evaluate Pt for Statin, Add Exclusion Dx Codes, Document   Exclusion in Problem List, Change Statin Intensity Level to Moderate or High Intensity if Applicable                Osteoporosis Screening []  Dexa Order Placed    []  Dexa Appointment Scheduled    []  External Records Requested & Care Team Updated    []  External  Records Uploaded, Care Team Updated, & History Updated if Applicable    []  Patient Declined Scheduling Dexa or Will Call Back to Schedule    []  Patient Will Schedule with External Provider / Order Routed & Care Team Updated if Applicable       Additional Notes:

## 2024-01-18 ENCOUNTER — TELEPHONE (OUTPATIENT)
Dept: ADMINISTRATIVE | Facility: HOSPITAL | Age: 64
End: 2024-01-18
Payer: MEDICARE

## 2024-01-18 NOTE — LETTER
AUTHORIZATION FOR RELEASE OF   CONFIDENTIAL INFORMATION    Dear Dr. Alegre,    We are seeing Lily Fraire, date of birth 1960, in the clinic at [facility]. Melquiades Parry MD is the patient's PCP. Lily Fraire has an outstanding lab/procedure at the time we reviewed her chart. In order to help keep her health information updated, she has authorized us to request the following medical record(s):        (  )  MAMMOGRAM                                      (X)  COLONOSCOPY      (  )  PAP SMEAR                                          (  )  OUTSIDE LAB RESULTS     (  )  DEXA SCAN                                          (  )  EYE EXAM            (  )  FOOT EXAM                                          (  )  ENTIRE RECORD     (  )  OUTSIDE IMMUNIZATIONS                 (  )  _______________       ATTN: KRIS    * PLEASE SEE SIGNED MEDICAL RELEASE FORM FOR RECORDS      Please fax records to Melquiades Parry MD, [phone]     If you have any questions, please contact Kris at [phone].           Patient Name: Lily Fraire  : 1960  Patient Phone #: [phone]

## 2024-06-24 ENCOUNTER — TELEPHONE (OUTPATIENT)
Dept: OBSTETRICS AND GYNECOLOGY | Facility: CLINIC | Age: 64
End: 2024-06-24
Payer: MEDICARE

## 2024-06-24 DIAGNOSIS — Z13.9 SCREENING DUE: ICD-10-CM

## 2024-06-24 DIAGNOSIS — Z12.31 ENCOUNTER FOR SCREENING MAMMOGRAM FOR MALIGNANT NEOPLASM OF BREAST: Primary | ICD-10-CM

## 2024-06-24 NOTE — TELEPHONE ENCOUNTER
----- Message from Shelby Joni sent at 6/24/2024  2:13 PM CDT -----  Patient is requesting a mammogram ordered to be sent to [facility] in [place]. Patient would like for someone to give her a call back In regards to matter.     Contact#[phone]                                                  Thank You!!!!

## 2024-06-24 NOTE — TELEPHONE ENCOUNTER
----- Message from David Peter sent at 6/24/2024  4:02 PM CDT -----  Who Called:TIMA CLAROS [[id number]]              Who Left Message for Patient:Shahnaz Haas              Does the patient know what this is regarding? Mammo order              Best Call Back Number:[phone]              Additional Information: Pt states she want to be schedule for mammo

## 2024-07-29 ENCOUNTER — HOSPITAL ENCOUNTER (OUTPATIENT)
Dept: RADIOLOGY | Facility: HOSPITAL | Age: 64
Discharge: HOME | End: 2024-07-29
Attending: NURSE PRACTITIONER
Payer: MEDICARE

## 2024-07-29 DIAGNOSIS — Z12.31 ENCOUNTER FOR SCREENING MAMMOGRAM FOR MALIGNANT NEOPLASM OF BREAST: ICD-10-CM

## 2024-07-29 PROCEDURE — 77063 BREAST TOMOSYNTHESIS BI: CPT | Mod: 26,,, | Performed by: RADIOLOGY

## 2024-07-29 PROCEDURE — 77063 BREAST TOMOSYNTHESIS BI: CPT | Mod: TC

## 2024-07-29 PROCEDURE — 77067 SCR MAMMO BI INCL CAD: CPT | Mod: 26,,, | Performed by: RADIOLOGY

## 2024-07-29 PROCEDURE — 77067 SCR MAMMO BI INCL CAD: CPT | Mod: TC

## 2024-08-02 ENCOUNTER — PATIENT MESSAGE (OUTPATIENT)
Dept: OBSTETRICS AND GYNECOLOGY | Facility: CLINIC | Age: 64
End: 2024-08-02
Payer: MEDICARE

## 2024-09-05 ENCOUNTER — PATIENT MESSAGE (OUTPATIENT)
Dept: OBSTETRICS AND GYNECOLOGY | Facility: CLINIC | Age: 64
End: 2024-09-05
Payer: MEDICARE

## 2024-09-05 DIAGNOSIS — N95.2 POST-MENOPAUSAL ATROPHIC VAGINITIS: ICD-10-CM

## 2024-09-05 DIAGNOSIS — Z79.890 POST-MENOPAUSE ON HRT (HORMONE REPLACEMENT THERAPY): ICD-10-CM

## 2024-09-05 RX ORDER — ESTRADIOL 0.1 MG/G
1 CREAM VAGINAL
Qty: 42.5 G | Refills: 3 | Status: SHIPPED | OUTPATIENT
Start: 2024-09-05 | End: 2025-09-05

## 2024-09-05 RX ORDER — ESTRADIOL 0.5 MG/1
0.5 TABLET ORAL DAILY
Qty: 90 TABLET | Refills: 3 | Status: SHIPPED | OUTPATIENT
Start: 2024-09-05

## 2024-09-19 ENCOUNTER — PATIENT MESSAGE (OUTPATIENT)
Dept: PRIMARY CARE CLINIC | Facility: CLINIC | Age: 64
End: 2024-09-19
Payer: MEDICARE

## 2024-10-07 ENCOUNTER — TELEPHONE (OUTPATIENT)
Dept: ELECTROPHYSIOLOGY | Facility: CLINIC | Age: 64
End: 2024-10-07
Payer: MEDICARE

## 2024-10-07 NOTE — PROGRESS NOTES
Subjective   Patient ID:  Lily Fraire is a 64 y.o. female who presents for evaluation of Palpitations      64 yoF here for arrhythmia management. She has nocturnal palpitations. She is able to exercise most days. She has no limitations. She describes waking up with her heart racing on most days.     Holter :  · Sinus rhythm with heart rates varying between 45 and 94 BPM with an average of 63 BPM.  · There were very rare PVCs totalling 1  · There were very rare PACs totalling 55 and averaging 2.29 per hour.  · One 3 beat SVT  · Symptoms correlated to NSR    Echo :  · The left ventricle is normal in size with normal systolic function.  · The estimated ejection fraction is 55%.  · Normal left ventricular diastolic function.  · Normal right ventricular size with normal right ventricular systolic function.  · Mild left atrial enlargement.  · Mild right atrial enlargement.  · Mild mitral regurgitation.  · Mild tricuspid regurgitation.  · Mild pulmonic regurgitation.  · Normal central venous pressure (3 mmHg).  · The estimated PA systolic pressure is 19 mmHg.  · Trivial posterior pericardial effusion.    My interpretation of the ECG is:  NSR nl IN, QRS, QTc    Past Medical History:  2023: Hyperlipidemia    Past Surgical History:  No date: AUGMENTATION OF BREAST; Bilateral      Comment:    No date: AUGMENTATION OF BREAST; Bilateral      Comment:  replaced in   No date: BREAST SURGERY  No date:  SECTION  No date: FACIAL COSMETIC SURGERY  No date: HEMORRHOID SURGERY  No date: HYSTERECTOMY  No date: OOPHORECTOMY    Social History    Socioeconomic History      Marital status:     Tobacco Use      Smoking status: Never      Smokeless tobacco: Never    Substance and Sexual Activity      Alcohol use: Not Currently      Drug use: Not Currently      Sexual activity: Not Currently    Social Drivers of Health  Financial Resource Strain: Low Risk  (10/7/2024)      Overall Financial Resource  Strain (CARDIA)          Difficulty of Paying Living Expenses: Not hard at all  Food Insecurity: No Food Insecurity (10/7/2024)      Hunger Vital Sign          Worried About Running Out of Food in the Last Year: Never true          Ran Out of Food in the Last Year: Never true  Physical Activity: Sufficiently Active (10/7/2024)      Exercise Vital Sign          Days of Exercise per Week: 4 days          Minutes of Exercise per Session: 40 min  Stress: Stress Concern Present (10/7/2024)      [employer] - Occupational Stress Questionnaire          Feeling of Stress : To some extent  Housing Stability: Unknown (10/7/2024)      Housing Stability Vital Sign          Unable to Pay for Housing in the Last Year: No    Review of patient's family history indicates:  Problem: Hypertension      Relation: Mother          Name:               Age of Onset: (Not Specified)  Problem: Heart disease      Relation: Mother          Name:               Age of Onset: (Not Specified)  Problem: Heart attack      Relation: Father          Name:               Age of Onset: (Not Specified)  Problem: Stroke      Relation: Father          Name:               Age of Onset: (Not Specified)  Problem: Hypertension      Relation: Father          Name:               Age of Onset: (Not Specified)  Problem: Heart disease      Relation: Father          Name:               Age of Onset: (Not Specified)  Problem: No Known Problems      Relation: Sister          Name:               Age of Onset: (Not Specified)  Problem: Heart disease      Relation: Brother          Name:               Age of Onset: (Not Specified)  Problem: No Known Problems      Relation: Daughter          Name:               Age of Onset: (Not Specified)      Current Outpatient Medications:  estradioL (ESTRACE) 0.01 % (0.1 mg/gram) vaginal cream, Place 1 g vaginally twice a week. Use nightly for 2 weeks then twice weekly for maintenance, Disp: 42.5 g, Rfl:  3  estradioL (ESTRACE) 0.5 MG tablet, Take 1 tablet (0.5 mg total) by mouth once daily., Disp: 90 tablet, Rfl: 3  JUBLIA 10 % Jossie, Apply topically every evening., Disp: , Rfl:     No current facility-administered medications for this visit.            Review of Systems   Constitutional: Negative.   HENT: Negative.     Eyes: Negative.    Cardiovascular:  Positive for irregular heartbeat and palpitations. Negative for chest pain, dyspnea on exertion, leg swelling, near-syncope and syncope.   Respiratory: Negative.  Negative for shortness of breath.    Endocrine: Negative.    Hematologic/Lymphatic: Negative.    Skin: Negative.    Musculoskeletal: Negative.    Gastrointestinal: Negative.    Genitourinary: Negative.    Neurological: Negative.  Negative for dizziness and light-headedness.   Psychiatric/Behavioral: Negative.     Allergic/Immunologic: Negative.           Objective     Physical Exam  Vitals reviewed.   Constitutional:       General: She is not in acute distress.     Appearance: She is well-developed.   HENT:      Head: Normocephalic and atraumatic.   Eyes:      Pupils: Pupils are equal, round, and reactive to light.   Neck:      Thyroid: No thyromegaly.      Vascular: No JVD.   Cardiovascular:      Rate and Rhythm: Normal rate and regular rhythm.      Chest Wall: PMI is not displaced.      Heart sounds: Normal heart sounds, S1 normal and S2 normal. No murmur heard.     No friction rub. No gallop.   Pulmonary:      Effort: Pulmonary effort is normal. No respiratory distress.      Breath sounds: Normal breath sounds. No wheezing or rales.   Abdominal:      General: Bowel sounds are normal. There is no distension.      Palpations: Abdomen is soft.      Tenderness: There is no abdominal tenderness. There is no guarding or rebound.   Musculoskeletal:         General: No tenderness. Normal range of motion.      Cervical back: Normal range of motion.   Skin:     General: Skin is warm and dry.      Findings: No  erythema or rash.   Neurological:      Mental Status: She is alert and oriented to person, place, and time.      Cranial Nerves: No cranial nerve deficit.   Psychiatric:         Behavior: Behavior normal.         Thought Content: Thought content normal.         Judgment: Judgment normal.            Assessment and Plan     1. Palpitations    2. PSVT (paroxysmal supraventricular tachycardia)        Plan:  64 yoF here for arrhythmia management. She has palpitations, mostly at night. No arrhythmias on holter 2022. Will reassess with echo and zio. RTC 4w

## 2024-10-07 NOTE — TELEPHONE ENCOUNTER
----- Message from Ev sent at 10/7/2024 12:45 PM CDT -----  Patient is returning a missed phone call. She can be reached at [phone].    Thank you

## 2024-10-08 ENCOUNTER — OFFICE VISIT (OUTPATIENT)
Dept: ELECTROPHYSIOLOGY | Facility: CLINIC | Age: 64
End: 2024-10-08
Payer: MEDICARE

## 2024-10-08 VITALS
BODY MASS INDEX: 19.23 KG/M2 | DIASTOLIC BLOOD PRESSURE: 82 MMHG | HEART RATE: 66 BPM | WEIGHT: 112.63 LBS | SYSTOLIC BLOOD PRESSURE: 130 MMHG | HEIGHT: 64 IN

## 2024-10-08 DIAGNOSIS — I47.10 PSVT (PAROXYSMAL SUPRAVENTRICULAR TACHYCARDIA): Primary | ICD-10-CM

## 2024-10-08 DIAGNOSIS — R00.2 PALPITATIONS: Primary | ICD-10-CM

## 2024-10-08 DIAGNOSIS — I47.10 PSVT (PAROXYSMAL SUPRAVENTRICULAR TACHYCARDIA): ICD-10-CM

## 2024-10-08 DIAGNOSIS — I48.0 PAROXYSMAL ATRIAL FIBRILLATION: Primary | ICD-10-CM

## 2024-10-08 DIAGNOSIS — I48.0 PAROXYSMAL ATRIAL FIBRILLATION: ICD-10-CM

## 2024-10-08 LAB
OHS QRS DURATION: 84 MS
OHS QTC CALCULATION: 429 MS

## 2024-10-08 PROCEDURE — 3075F SYST BP GE 130 - 139MM HG: CPT | Mod: CPTII,S$GLB,, | Performed by: INTERNAL MEDICINE

## 2024-10-08 PROCEDURE — 3288F FALL RISK ASSESSMENT DOCD: CPT | Mod: CPTII,S$GLB,, | Performed by: INTERNAL MEDICINE

## 2024-10-08 PROCEDURE — 99204 OFFICE O/P NEW MOD 45 MIN: CPT | Mod: 25,S$GLB,, | Performed by: INTERNAL MEDICINE

## 2024-10-08 PROCEDURE — 1126F AMNT PAIN NOTED NONE PRSNT: CPT | Mod: CPTII,S$GLB,, | Performed by: INTERNAL MEDICINE

## 2024-10-08 PROCEDURE — 3079F DIAST BP 80-89 MM HG: CPT | Mod: CPTII,S$GLB,, | Performed by: INTERNAL MEDICINE

## 2024-10-08 PROCEDURE — 1101F PT FALLS ASSESS-DOCD LE1/YR: CPT | Mod: CPTII,S$GLB,, | Performed by: INTERNAL MEDICINE

## 2024-10-08 PROCEDURE — 99999 PR PBB SHADOW E&M-EST. PATIENT-LVL III: CPT | Mod: PBBFAC,,, | Performed by: INTERNAL MEDICINE

## 2024-10-08 PROCEDURE — 1159F MED LIST DOCD IN RCRD: CPT | Mod: CPTII,S$GLB,, | Performed by: INTERNAL MEDICINE

## 2024-10-08 PROCEDURE — 3008F BODY MASS INDEX DOCD: CPT | Mod: CPTII,S$GLB,, | Performed by: INTERNAL MEDICINE

## 2024-10-08 PROCEDURE — 93000 ELECTROCARDIOGRAM COMPLETE: CPT | Mod: S$GLB,,, | Performed by: STUDENT IN AN ORGANIZED HEALTH CARE EDUCATION/TRAINING PROGRAM

## 2024-10-10 ENCOUNTER — CLINICAL SUPPORT (OUTPATIENT)
Dept: CARDIOLOGY | Facility: HOSPITAL | Age: 64
End: 2024-10-10
Attending: INTERNAL MEDICINE
Payer: MEDICARE

## 2024-10-10 ENCOUNTER — DOCUMENTATION ONLY (OUTPATIENT)
Dept: CARDIOLOGY | Facility: HOSPITAL | Age: 64
End: 2024-10-10
Payer: MEDICARE

## 2024-10-10 DIAGNOSIS — R00.2 PALPITATIONS: ICD-10-CM

## 2024-10-10 DIAGNOSIS — I47.10 PSVT (PAROXYSMAL SUPRAVENTRICULAR TACHYCARDIA): ICD-10-CM

## 2024-10-10 PROCEDURE — 93246 EXT ECG>7D<15D RECORDING: CPT

## 2024-10-10 NOTE — PROGRESS NOTES
IRhythm monitoring was contacted for insurance verification on the above patient 14 day monitor. I had the pleasure of speaking with arenia on today.The estimated out of pocket cost of $259.36 was informed to be the patient portion of responsibility for service stated by [employer].Patient verbalized understanding and agreed to proceed with placement of monitor.    Call reference number is as followed:[id number]  [employer] [id number]

## 2024-10-15 ENCOUNTER — PATIENT MESSAGE (OUTPATIENT)
Dept: RESEARCH | Facility: HOSPITAL | Age: 64
End: 2024-10-15
Payer: MEDICARE

## 2024-10-29 ENCOUNTER — HOSPITAL ENCOUNTER (OUTPATIENT)
Dept: CARDIOLOGY | Facility: HOSPITAL | Age: 64
Discharge: HOME | End: 2024-10-29
Attending: INTERNAL MEDICINE
Payer: MEDICARE

## 2024-10-29 PROCEDURE — 93306 TTE W/DOPPLER COMPLETE: CPT | Mod: 26,,, | Performed by: INTERNAL MEDICINE

## 2024-10-29 PROCEDURE — 93306 TTE W/DOPPLER COMPLETE: CPT

## 2024-11-08 NOTE — PROGRESS NOTES
Ms. Fraire is a patient of Dr. Moseley and was last seen in clinic 10/8/2024.      Subjective:   Patient ID:  Lily Fraire is a 64 y.o. female who presents for follow up of Palpitations  .     HPI:    Ms. Fraire is a 64 y.o. female with palpitations here for follow up.     Background:    64 yoF here for arrhythmia management. She has nocturnal palpitations. She is able to exercise most days. She has no limitations. She describes waking up with her heart racing on most days.   She has palpitations, mostly at night. No arrhythmias on holter 2022. Will reassess with echo and zio. RTC 4w     Update (11/12/2024):    Monitor 10/10/2024:  Predominant underlying rhythm was Sinus Rhythm.   Patient had a min HR of 47 bpm, max HR of 169 bpm, and avg HR of 67 bpm.   6 Non-sustained Supraventricular Tachycardia runs occurred, up to 8 beats  Isolated SVEs were rare (<1.0%, 335), SVE Couplets were rare (<1.0%, 37), and SVE Triplets were rare (<1.0%, 19).   Isolated VEs were rare (<1.0%, 290), VE Couplets were rare (<1.0%, 19), and no VE Triplets were present. Ventricular Trigeminy was present.    Today she says other than her palpitations she feels well. Runs 2 miles most days (intervals). No MILLAN or CP. Chronic fatigue. Doesn't sleep - frequent wakening - with heart fluttering upon wakening.   No LH or syncope reported.    I have personally reviewed the patient's EKG today, which shows sinus becki at 54bpm. SD interval is 156. QRS is 84. QT is 446.    Relevant Cardiac Test Results:    2D Echo (10/29/2024):    Left Ventricle: The left ventricle is normal in size. There is mild concentric hypertrophy. There is normal systolic function with a visually estimated ejection fraction of 55 - 60%.    Right Ventricle: Normal right ventricular cavity size. Systolic function is normal.    Pulmonary Artery: The estimated pulmonary artery systolic pressure is 27 mmHg.    IVC/SVC: Intermediate venous pressure at 8 mmHg.    Pericardium: There is  "a trivial posterior effusion.    Current Outpatient Medications   Medication Sig    estradioL (ESTRACE) 0.01 % (0.1 mg/gram) vaginal cream Place 1 g vaginally twice a week. Use nightly for 2 weeks then twice weekly for maintenance    estradioL (ESTRACE) 0.5 MG tablet Take 1 tablet (0.5 mg total) by mouth once daily.    JUBLIA 10 % Jossie Apply topically every evening.     No current facility-administered medications for this visit.       Review of Systems   Constitutional: Positive for malaise/fatigue.   Cardiovascular:  Negative for chest pain, dyspnea on exertion, irregular heartbeat, leg swelling and palpitations.   Respiratory:  Negative for shortness of breath.    Hematologic/Lymphatic: Negative for bleeding problem.   Skin:  Negative for rash.   Musculoskeletal:  Negative for myalgias.   Gastrointestinal:  Negative for hematemesis, hematochezia and nausea.   Genitourinary:  Negative for hematuria.   Neurological:  Negative for light-headedness.   Psychiatric/Behavioral:  Negative for altered mental status. The patient has insomnia (frequent night wakening) and is nervous/anxious.    Allergic/Immunologic: Negative for persistent infections.       Objective:          /74   Pulse (!) 54   Ht 5' 4" (1.626 m)   Wt 53 kg (116 lb 13.5 oz)   BMI 20.06 kg/m²     Physical Exam  Vitals and nursing note reviewed.   Constitutional:       Appearance: Normal appearance. She is well-developed.   HENT:      Head: Normocephalic.      Nose: Nose normal.   Eyes:      Pupils: Pupils are equal, round, and reactive to light.   Cardiovascular:      Rate and Rhythm: Regular rhythm. Bradycardia present.   Pulmonary:      Effort: No respiratory distress.   Musculoskeletal:         General: Normal range of motion.   Skin:     General: Skin is warm and dry.      Findings: No erythema.   Neurological:      Mental Status: She is alert and oriented to person, place, and time.   Psychiatric:         Speech: Speech normal.         " Behavior: Behavior normal.           Lab Results   Component Value Date     12/31/2023    K 4.2 12/31/2023    MG 2.1 12/12/2022    BUN 17 12/31/2023    CREATININE 0.7 12/31/2023    ALT 27 12/31/2023    AST 28 12/31/2023    HGB 11.8 (L) 12/31/2023    HCT 36.3 (L) 12/31/2023    TSH 1.838 12/31/2023    LDLCALC 124.8 12/31/2023           Assessment:     1. Palpitations    2. Premature atrial contractions    3. Frequent nocturnal awakening      Plan:     In summary, Ms. Fraire is a 64 y.o. female with palpitations here for follow up.  Pt here to discuss findings of echo and monitor. Echo showed normal heart structure and function. Reassurance provided.   Monitor also showed no concerning arrhythmias. Rare ectopy and brief NSAT. Symptoms c/w SR. Suspect palpitations/heart racing related to frequent night wakening. Will refer to sleep medicine.  Continue regular exercise and routine follow up with PCP.    Sleep referral   Continue exercise  RTC PRN      *A copy of this note has been sent to Dr. Moseley*    Follow up if symptoms worsen or fail to improve.    ------------------------------------------------------------------    VANESA Lugo, NP-C  Cardiac Electrophysiology

## 2024-11-12 ENCOUNTER — OFFICE VISIT (OUTPATIENT)
Dept: SLEEP MEDICINE | Facility: CLINIC | Age: 64
End: 2024-11-12
Payer: MEDICARE

## 2024-11-12 ENCOUNTER — HOSPITAL ENCOUNTER (OUTPATIENT)
Dept: CARDIOLOGY | Facility: CLINIC | Age: 64
Discharge: HOME | End: 2024-11-12
Payer: MEDICARE

## 2024-11-12 ENCOUNTER — OFFICE VISIT (OUTPATIENT)
Dept: ELECTROPHYSIOLOGY | Facility: CLINIC | Age: 64
End: 2024-11-12
Payer: MEDICARE

## 2024-11-12 VITALS
HEIGHT: 64 IN | BODY MASS INDEX: 19.96 KG/M2 | WEIGHT: 116.88 LBS | DIASTOLIC BLOOD PRESSURE: 74 MMHG | HEART RATE: 54 BPM | SYSTOLIC BLOOD PRESSURE: 122 MMHG

## 2024-11-12 DIAGNOSIS — R00.2 PALPITATIONS: Primary | ICD-10-CM

## 2024-11-12 DIAGNOSIS — G47.00 FREQUENT NOCTURNAL AWAKENING: ICD-10-CM

## 2024-11-12 DIAGNOSIS — I47.10 PSVT (PAROXYSMAL SUPRAVENTRICULAR TACHYCARDIA): ICD-10-CM

## 2024-11-12 DIAGNOSIS — I49.1 PREMATURE ATRIAL CONTRACTIONS: ICD-10-CM

## 2024-11-12 DIAGNOSIS — G47.30 SLEEP APNEA, UNSPECIFIED TYPE: Primary | ICD-10-CM

## 2024-11-12 LAB
OHS QRS DURATION: 84 MS
OHS QTC CALCULATION: 422 MS

## 2024-11-12 PROCEDURE — 99214 OFFICE O/P EST MOD 30 MIN: CPT | Mod: S$GLB,,, | Performed by: NURSE PRACTITIONER

## 2024-11-12 PROCEDURE — 93010 ELECTROCARDIOGRAM REPORT: CPT | Mod: S$GLB,,, | Performed by: INTERNAL MEDICINE

## 2024-11-12 PROCEDURE — 3288F FALL RISK ASSESSMENT DOCD: CPT | Mod: CPTII,S$GLB,, | Performed by: NURSE PRACTITIONER

## 2024-11-12 PROCEDURE — 1126F AMNT PAIN NOTED NONE PRSNT: CPT | Mod: CPTII,S$GLB,, | Performed by: NURSE PRACTITIONER

## 2024-11-12 PROCEDURE — 99203 OFFICE O/P NEW LOW 30 MIN: CPT | Mod: 95,,, | Performed by: NURSE PRACTITIONER

## 2024-11-12 PROCEDURE — 1101F PT FALLS ASSESS-DOCD LE1/YR: CPT | Mod: CPTII,S$GLB,, | Performed by: NURSE PRACTITIONER

## 2024-11-12 PROCEDURE — 3008F BODY MASS INDEX DOCD: CPT | Mod: CPTII,S$GLB,, | Performed by: NURSE PRACTITIONER

## 2024-11-12 PROCEDURE — 93005 ELECTROCARDIOGRAM TRACING: CPT | Mod: S$GLB,,, | Performed by: INTERNAL MEDICINE

## 2024-11-12 PROCEDURE — 3078F DIAST BP <80 MM HG: CPT | Mod: CPTII,S$GLB,, | Performed by: NURSE PRACTITIONER

## 2024-11-12 PROCEDURE — 3074F SYST BP LT 130 MM HG: CPT | Mod: CPTII,S$GLB,, | Performed by: NURSE PRACTITIONER

## 2024-11-12 PROCEDURE — 1159F MED LIST DOCD IN RCRD: CPT | Mod: CPTII,S$GLB,, | Performed by: NURSE PRACTITIONER

## 2024-11-12 PROCEDURE — 99999 PR PBB SHADOW E&M-EST. PATIENT-LVL III: CPT | Mod: PBBFAC,,, | Performed by: NURSE PRACTITIONER

## 2024-11-12 NOTE — PROGRESS NOTES
"Referred by JOSE Bergeron    CHIEF COMPLAINT: Sleep evaluation    HISTORY OF PRESENT ILLNESS: She has never had a sleep study. Sleeps alone and not been told she snores. Having nightly waking up with heart fluttering. Recurrent episodes or nocturnal palpitations as she did 2 yrs ago. Not getting hardly any sleep due to frequent disruptions. She is tired and fatigued during daytime. Even with tylenol pm and zquil recently, still having disrupted sleep. Bedroom is conducive to sleep. She lacks deep sleep. Symptoms ongoing 6mo-1 yr.     Per cards note: Monitor 10/10/2024:  Predominant underlying rhythm was Sinus Rhythm.   Patient had a min HR of 47 bpm, max HR of 169 bpm, and avg HR of 67 bpm.   6 Non-sustained Supraventricular Tachycardia runs occurred, up to 8 beats  Isolated SVEs were rare (<1.0%, 335), SVE Couplets were rare (<1.0%, 37), and SVE Triplets were rare (<1.0%, 19).   Isolated VEs were rare (<1.0%, 290), VE Couplets were rare (<1.0%, 19), and no VE Triplets were present. Ventricular Trigeminy was present.    Denies symptoms of am headaches, chronic sinus congestion    FAMILY HISTORY: sister EVELIO 200+ lbs  SOCIAL HISTORY:single  BMI 20, 116#, 5'4"      ASSESSMENT:   Unspecified Sleep Apnea, with symptoms of potential snoring, persistent un-refreshing disrupted sleep and daytime sleepiness,  with medical comorbidities of paroxsymal SVT, nocturnal palpitations. Warrants further investigation for untreated sleep apnea.     PLAN:   1. Polysomnogram, discussed plan of care      Thank you for allowing me the opportunity to participate in the care of your patient        "

## 2024-11-18 ENCOUNTER — TELEPHONE (OUTPATIENT)
Dept: SLEEP MEDICINE | Facility: OTHER | Age: 64
End: 2024-11-18
Payer: MEDICARE

## 2024-11-19 ENCOUNTER — PATIENT MESSAGE (OUTPATIENT)
Dept: ADMINISTRATIVE | Facility: HOSPITAL | Age: 64
End: 2024-11-19
Payer: MEDICARE

## 2024-11-25 ENCOUNTER — HOSPITAL ENCOUNTER (OUTPATIENT)
Dept: SLEEP MEDICINE | Facility: OTHER | Age: 64
Discharge: HOME | End: 2024-11-25
Payer: MEDICARE

## 2024-11-25 ENCOUNTER — TELEPHONE (OUTPATIENT)
Dept: GASTROENTEROLOGY | Facility: CLINIC | Age: 64
End: 2024-11-25
Payer: MEDICARE

## 2024-11-25 ENCOUNTER — PATIENT OUTREACH (OUTPATIENT)
Dept: ADMINISTRATIVE | Facility: HOSPITAL | Age: 64
End: 2024-11-25
Payer: MEDICARE

## 2024-11-25 DIAGNOSIS — Z12.11 ENCOUNTER FOR SCREENING COLONOSCOPY FOR NON-HIGH-RISK PATIENT: Primary | ICD-10-CM

## 2024-11-25 DIAGNOSIS — I49.1 PREMATURE ATRIAL CONTRACTIONS: ICD-10-CM

## 2024-11-25 DIAGNOSIS — Z12.11 SCREENING FOR COLON CANCER: Primary | ICD-10-CM

## 2024-11-25 DIAGNOSIS — G47.30 SLEEP APNEA, UNSPECIFIED TYPE: ICD-10-CM

## 2024-11-25 PROCEDURE — 95810 POLYSOM 6/> YRS 4/> PARAM: CPT

## 2024-11-25 RX ORDER — SODIUM, POTASSIUM,MAG SULFATES 17.5-3.13G
1 SOLUTION, RECONSTITUTED, ORAL ORAL DAILY
Qty: 1 KIT | Refills: 0 | Status: SHIPPED | OUTPATIENT
Start: 2024-11-25 | End: 2024-11-27

## 2024-11-25 NOTE — PROGRESS NOTES
Health Maintenance Due   Topic Date Due    Colorectal Cancer Screening  Never done    Influenza Vaccine (1) 09/01/2024     Immunizations - reviewed and updated   Care Everywhere - triggered   Care Teams - updated   Outreach - Epic campaigns outreach done  Colon cancer screening due. Case request for screening colonoscopy placed. Staff message sent to [facility]

## 2024-11-25 NOTE — TELEPHONE ENCOUNTER
Called pt to schedule colonoscopy. Pt is scheduled on 01/13/2025 for colonoscopy. Bowel prep sent to pt's preferred pharmacy and instructions sent to pt via my chart.

## 2024-11-26 NOTE — PROGRESS NOTES
[facility]     Pt did not meet criteria for split night study.     Post study information given to pt in AM

## 2024-11-28 NOTE — PROCEDURES
"  [facility]    Polysomnography Interpretation Report    Patient Name:  TIMA CLAROS  MRN#:  [id number]  :  1960  Study Date:  2024  Referring Provider:  PEARL REEVES NP    Indications for Polysomnography:  The patient is a 64 year old Female who is 5' 4" and weighs 116.0 lbs.  Her BMI equals 20.0.  [place] was - and Neck Circumference was -.  A full night polysomnogram was performed to evaluate for -.    Polysomnogram Data  A full night polysomnogram recorded the standard physiologic parameters including EEG, EOG, EMG, EKG, nasal and oral airflow.  Respiratory parameters of chest and abdominal movements were recorded with (RIP) Respiratory Inductance Plethsmography.  Oxygen saturation was recorded by pulse oximetry.    Sleep Architecture  The total recording time of the polysomnogram was 372.5 minutes.  The total sleep time was 283.5 minutes.  The patient spent 6.3% of total sleep time in Stage N1, 52.4% in Stage N2, 10.9% in Stages N3, and 30.3% in REM.  Sleep latency was 23.6 minutes.  REM latency was 61.5 minutes.  Sleep Efficiency was 76.1%.  Wake after sleep onset was 65.0 minutes.    Respiratory Events  The polysomnogram revealed a presence of - obstructive, 2 central, and - mixed apneas resulting in a Total Apnea index of 0.4 events per hour.  There were 30 hypopneas resulting in a Total Hypopnea index of 6.3 events per hour.  The combined Apnea/Hypopnea index was 6.8 events per hour.  There were a total of 5 RERA events resulting in a Respiratory Disturbance Index (RDI) of 7.8 events per hour.     Mean oxygen saturation was 95.4%.  The lowest oxygen saturation during sleep was 83.0%.  Time spent <=88% oxygen saturation was 3.7 minutes (1.0%).    Limb Activity  There were - limb movements recorded.  Of this total, - were classified as PLMs.  Of the PLMs, - were associated with arousals.  The Limb Movement index was - per hour while the PLM index was - per hour and PLM with " "arousals index was - per hour.      Cardiac:  single lead EKG revealed normal sinus rhythm     Oxygenation:  Hypoxemia was only observed in relation to obstructive events.    Impression:  -mild obstructive sleep apnea significantly worse SUPINE(supine AHI=12 vs non-supine AHI=2)    Recommendations:  -treatment for the EVELIO seen in this study would be reasonable if the patient has sleep-related complaints or significant comorbidities  -the patient has follow up with Sleep Medicine          Antelmo Bonds MD    (This Sleep Study was interpreted by a Board Certified Sleep Specialist who conducted an epoch-by-epoch review of the entire raw data recording.)  (The indication for this sleep study was reviewed and deemed appropriate by AASM Practice Parameters or other reasons by a Board Certified Sleep Specialist.)          [facility]    Diagnostic PSG Report    Patient Name: TIMA CLAROS Study Date: 11/25/2024   YOB: 1960 MRN #: [id number]   Age: 64 year MERCED #: [id number]   Sex: Female Referring Provider: PEARL REEVES NP   Height: 5' 4" Recording Tech: Rick Lynch RPSGT   Weight: 116.0 lbs Scoring Tech: Enrique Coyle RRT RPSGT   BMI: 20.0 Interpreting Physician: -   ESS: - Neck Circumference: -     Study Overview    Lights Off: 11:13:16 PM  Count Index   Lights On: 05:25:48 AM Awakenings: 27 5.7   Time in Bed: 372.5 min. Arousals: 49 10.4   Total Sleep Time: 283.5 min. Apneas & Hypopneas: 32 6.8    Sleep Efficiency: 76.1% Limb Movements: - -   Sleep Latency: 23.6 min. Snores: - -   Wake After Sleep Onset: 65.0 min. Desaturations: 25 5.3    REM Latency from Sleep Onset: 61.5 min. Minimum SpO2 TST: 83.0%        Sleep Architecture   % of Time in Bed  Stages Time (mins) % Sleep Time   Wake 89.5    Stage N1 18.0 6.3%   Stage N2 148.5 52.4%   Stage N3 31.0 10.9%   REM 86.0 30.3%         Arousal Summary     NREM REM Sleep Index   Respiratory Arousals 7 2 9 1.9   PLM Arousals - - - -   Isolated " Limb Movement Arousals - - - -   Spontaneous Arousals 30 10 40 8.5   Total 37 12 49 10.4       Limb Movement Summary     Count Index   Isolated Limb Movements - -   Periodic Limb Movements (PLMs) - -   Total Limb Movements - -         Respiratory Summary     By Sleep Stage By Body Position Total    NREM REM Supine Non-Supine    Time (min) 197.5 86.0 170.0 113.5 283.5           Obstructive Apnea - - - - -   Mixed Apnea - - - - -   Central Apnea - 2 - 2 2   Central Apnea Index - 1.4 - - -   Total Apneas - 2 - 2 2   Total Apnea Index - 1.4 - 1.1 0.4           Hypopnea 15 15 30 - 30   Hypopnea Index 4.6 10.5 10.6 - 6.3           Apnea & Hypopnea 15 17 30 2 32   Apnea & Hypopnea Index 4.6 11.9 10.6 1.1 6.8           RERAs 3 2 4 1 5   RERA Index 0.9 1.4 1.4 0.5 1.1           RDI 5.5 13.3 12.0 1.6 7.8     Scoring Criteria: Hypopneas scored at 4% desaturation criteria.    Respiratory Event Durations     Apnea Hypopnea    NREM REM NREM REM   Average (seconds) - 11.4 17.1 31.6   Maximum (seconds) - 12.3 29.3 59.2       Oxygen Saturation Summary     Wake NREM REM TST TIB   Average SpO2 95.5% 95.4% 95.1% 95.3% 95.4%   Minimum SpO2 83.0% 83.0% 84.0% 83.0% 83.0%   Maximum SpO2 99.0% 98.0% 98.0% 98.0% 99.0%       Oxygen Saturation Distribution    Range (%) Time in range (min) Time in range (%)   90.0 - 100.0 366.2 98.3%   80.0 - 90.0 6.3 1.7%   70.0 - 80.0 - -   60.0 - 70.0 - -   50.0 - 60.0 - -   0.0 - 50.0 - -   Time Spent <=88% SpO2    Range (%) Time in range (min) Time in range (%)   0.0 - 88.0 3.7 1.0%          Count Index   Desaturations 25 5.3      Cardiac Summary     Wake NREM REM Sleep Total   Average Pulse Rate (BPM) 57.9 54.3 55.6 54.7 55.5   Minimum Pulse Rate (BPM) 51.0 49.0 50.0 49.0 49.0   Maximum Pulse Rate (BPM) 88.0 70.0 71.0 71.0 88.0     Pulse Rate Distribution    Range (bpm) Time in range (min) Time in range (%)   0.0 - 40.0 - -   40.0 - 60.0 353.1 94.7%   60.0 - 80.0 19.7 5.3%   80.0 - 100.0 0.2 0.0%   100.0  - 120.0 - -   120.0 - 140.0 - -   140.0 - 200.0 - -     EtCO2 Summary    Stage Min (mmHg) Average (mmHg) Max (mmHg)   Wake - - -   NREM(1+2+3) - - -   REM - - -     Range (mmHg) Time in range (min) Time in range (%)   20.0 - 40.0 - -   40.0 - 50.0 - -   50.0 - 55.0 - -   55.0 - 100.0 - -   Excluded data <20.0 & >65.0 373.0 100.0%     TcCO2 Summary    Stage Min (mmHg) Average (mmHg) Max (mmHg)   Wake - - -   NREM(1+2+3) - - -   REM - - -     Range (mmHg) Time in range (min) Time in range (%)   20.0 - 40.0 - -   40.0 - 50.0 - -   50.0 - 55.0 - -   55.0 - 100.0 - -   Excluded data <20.0 & >65.0 373.0 100.0%     Comments    -

## 2024-12-04 ENCOUNTER — PATIENT MESSAGE (OUTPATIENT)
Dept: SLEEP MEDICINE | Facility: CLINIC | Age: 64
End: 2024-12-04

## 2024-12-04 DIAGNOSIS — G47.33 OSA (OBSTRUCTIVE SLEEP APNEA): Primary | ICD-10-CM

## 2024-12-04 PROCEDURE — 95810 POLYSOM 6/> YRS 4/> PARAM: CPT | Mod: 26,,, | Performed by: INTERNAL MEDICINE

## 2024-12-24 ENCOUNTER — OFFICE VISIT (OUTPATIENT)
Dept: PRIMARY CARE CLINIC | Facility: CLINIC | Age: 64
End: 2024-12-24
Payer: MEDICARE

## 2024-12-24 VITALS
OXYGEN SATURATION: 97 % | RESPIRATION RATE: 12 BRPM | BODY MASS INDEX: 19.86 KG/M2 | DIASTOLIC BLOOD PRESSURE: 72 MMHG | HEART RATE: 60 BPM | TEMPERATURE: 97 F | HEIGHT: 64 IN | WEIGHT: 116.31 LBS | SYSTOLIC BLOOD PRESSURE: 114 MMHG

## 2024-12-24 DIAGNOSIS — Z12.11 ENCOUNTER FOR SCREENING COLONOSCOPY FOR NON-HIGH-RISK PATIENT: ICD-10-CM

## 2024-12-24 DIAGNOSIS — Z23 NEED FOR VACCINATION: ICD-10-CM

## 2024-12-24 DIAGNOSIS — E78.5 HYPERLIPIDEMIA, UNSPECIFIED HYPERLIPIDEMIA TYPE: ICD-10-CM

## 2024-12-24 DIAGNOSIS — R00.2 PALPITATIONS: ICD-10-CM

## 2024-12-24 DIAGNOSIS — G47.33 OSA ON CPAP: ICD-10-CM

## 2024-12-24 DIAGNOSIS — Z00.00 ANNUAL PHYSICAL EXAM: Primary | ICD-10-CM

## 2024-12-24 PROBLEM — I47.10 PSVT (PAROXYSMAL SUPRAVENTRICULAR TACHYCARDIA): Status: RESOLVED | Noted: 2023-01-05 | Resolved: 2024-12-24

## 2024-12-24 PROCEDURE — 3008F BODY MASS INDEX DOCD: CPT | Mod: CPTII,S$GLB,, | Performed by: FAMILY MEDICINE

## 2024-12-24 PROCEDURE — 90653 IIV ADJUVANT VACCINE IM: CPT | Mod: S$GLB,,, | Performed by: FAMILY MEDICINE

## 2024-12-24 PROCEDURE — 3074F SYST BP LT 130 MM HG: CPT | Mod: CPTII,S$GLB,, | Performed by: FAMILY MEDICINE

## 2024-12-24 PROCEDURE — 99397 PER PM REEVAL EST PAT 65+ YR: CPT | Mod: 25,S$GLB,, | Performed by: FAMILY MEDICINE

## 2024-12-24 PROCEDURE — 1101F PT FALLS ASSESS-DOCD LE1/YR: CPT | Mod: CPTII,S$GLB,, | Performed by: FAMILY MEDICINE

## 2024-12-24 PROCEDURE — 99999 PR PBB SHADOW E&M-EST. PATIENT-LVL III: CPT | Mod: PBBFAC,,, | Performed by: FAMILY MEDICINE

## 2024-12-24 PROCEDURE — G0008 ADMIN INFLUENZA VIRUS VAC: HCPCS | Mod: S$GLB,,, | Performed by: FAMILY MEDICINE

## 2024-12-24 PROCEDURE — 1160F RVW MEDS BY RX/DR IN RCRD: CPT | Mod: CPTII,S$GLB,, | Performed by: FAMILY MEDICINE

## 2024-12-24 PROCEDURE — 3288F FALL RISK ASSESSMENT DOCD: CPT | Mod: CPTII,S$GLB,, | Performed by: FAMILY MEDICINE

## 2024-12-24 PROCEDURE — 1126F AMNT PAIN NOTED NONE PRSNT: CPT | Mod: CPTII,S$GLB,, | Performed by: FAMILY MEDICINE

## 2024-12-24 PROCEDURE — 1159F MED LIST DOCD IN RCRD: CPT | Mod: CPTII,S$GLB,, | Performed by: FAMILY MEDICINE

## 2024-12-24 PROCEDURE — 3078F DIAST BP <80 MM HG: CPT | Mod: CPTII,S$GLB,, | Performed by: FAMILY MEDICINE

## 2024-12-24 RX ORDER — SODIUM, POTASSIUM,MAG SULFATES 17.5-3.13G
1 SOLUTION, RECONSTITUTED, ORAL ORAL DAILY
Qty: 1 KIT | Refills: 0 | Status: SHIPPED | OUTPATIENT
Start: 2024-12-24 | End: 2024-12-26

## 2024-12-24 NOTE — PROGRESS NOTES
Assessment:       1. Annual physical exam    2. EVELIO on CPAP    3. Hyperlipidemia, unspecified hyperlipidemia type    4. Palpitations    5. Need for vaccination    6. Encounter for screening colonoscopy for non-high-risk patient         Plan:       Annual physical exam  -     CBC Auto Differential; Future; Expected date: 12/24/2024  -     Comprehensive Metabolic Panel; Future; Expected date: 12/24/2024  -     Lipid Panel; Future; Expected date: 12/24/2024  -     TSH; Future; Expected date: 12/24/2024    EVELIO on CPAP  -     TSH; Future; Expected date: 12/24/2024    Hyperlipidemia, unspecified hyperlipidemia type  -     CBC Auto Differential; Future; Expected date: 12/24/2024  -     Comprehensive Metabolic Panel; Future; Expected date: 12/24/2024  -     Lipid Panel; Future; Expected date: 12/24/2024    Palpitations  -     TSH; Future; Expected date: 12/24/2024    Need for vaccination  -     influenza (adjuvanted) (Fluad) 45 mcg/0.5 mL IM vaccine (> or = 64 yo) 0.5 mL    Encounter for screening colonoscopy for non-high-risk patient  -     sodium,potassium,mag sulfates (SUPREP BOWEL PREP KIT) 17.5-3.13-1.6 gram SolR; Take 177 mLs by mouth once daily. for 2 days  Dispense: 1 kit; Refill: 0      Assessment & Plan    - Evaluated patient's reported palpitations; previous cardiologist workup unremarkable  - Sleep apnea diagnosed by different cardiologist at [facility]; sleep study confirmed diagnosis  - CPAP therapy initiated 2 weeks ago, resolving palpitations and improving sleep quality  - Assessed weight gain; BMI remains under 20, not clinically concerning at this time  - Reviewed colonoscopy history; previous exam normal, indicating low risk for colon cancer    SLEEP APNEA:   Instructed the patient to continue using CPAP machine for sleep apnea management.   Patient was experiencing palpitations and waking up every 1.5 hours.   Performed a sleep test and diagnosed the patient with sleep apnea.   Noted improvement in  symptoms after 2 weeks of using the sleep machine.   Acknowledged the resolution of palpitations and improved sleep due to sleep apnea treatment.   Confirmed the patient is using a sleep machine for sleep apnea treatment.   Noted improvement in symptoms after using the sleep machine.    WEIGHT MANAGEMENT:   Noted the patient's weight and BMI are within normal range.   Reviewed the patient's weight history and discussed weight management strategies.   Recommend dietary modifications to address recent weight gain, particularly reducing sweet food intake.    COLONOSCOPY SCREENING:   Discussed the importance of colonoscopy screening and age-based recommendations.   Explained the rationale for discontinuing colonoscopy screening after age 75 if previous results were normal.   Noted the patient's last colonoscopy was normal.   Discussed colonoscopy screening frequency based on previous normal results.   Scheduled a colonoscopy and instructed the office to provide printed instructions before the patient leaves.    FLU VACCINATION:   Noted the patient hadn't received a flu vaccine this year.   Recommend and administered the flu vaccine during the visit.   Administered flu vaccine in office.    LABS:   Ordered routine labs.   Instructed the patient to complete ordered labs at lab, fasting required (water only after midnight).    OTHER INSTRUCTIONS:   Performed physical exam, including chest and breathing assessment.   Patient to maintain current exercise routine, acknowledging different exertion levels for various activities.    FOLLOW UP:   Scheduled follow up in 1 year for annual visit.       Medication List with Changes/Refills   Current Medications    ESTRADIOL (ESTRACE) 0.01 % (0.1 MG/GRAM) VAGINAL CREAM    Place 1 g vaginally twice a week. Use nightly for 2 weeks then twice weekly for maintenance    ESTRADIOL (ESTRACE) 0.5 MG TABLET    Take 1 tablet (0.5 mg total) by mouth once daily.   Changed and/or Refilled  "Medications    Modified Medication Previous Medication    SODIUM,POTASSIUM,MAG SULFATES (SUPREP BOWEL PREP KIT) 17.5-3.13-1.6 GRAM SOLR sodium,potassium,mag sulfates (SUPREP BOWEL PREP KIT) 17.5-3.13-1.6 gram SolR       Take 177 mLs by mouth once daily. for 2 days    Take 177 mLs by mouth once daily. for 2 days         Subjective:    Patient ID: Lily Fraire is a 64 y.o. female.  Chief Complaint: Annual Exam    HPI  History of Present Illness    CHIEF COMPLAINT:  Patient presents today for a yearly check-up.    SLEEP APNEA:  She was diagnosed with sleep apnea and has been using CPAP for 2 weeks. She reports improved sleep quality and resolution of nighttime awakenings since initiating therapy. She experienced palpitations occurring every 1.5 hours during sleep over the past 6 months, which have completely resolved with CPAP therapy.    FATIGUE:  She continues to experience fatigue, which she attributes to aging and her demanding work schedule, working 6 AM to 5 PM six days per week with double shifts on Saturdays.    DIET AND EXERCISE:  She has gained 10 lbs over the past 2 years and reports difficulty with weight loss. She admits to consuming unhealthy foods like donuts. Despite running four days per week, she has experienced difficulty walking up steps.    PREVIOUS STUDIES:  Her previous colonoscopy was normal and she reports no abnormalities in last year's labs.      ROS:  Constitutional: -weight loss, +fatigue  Cardiovascular: -palpitations  Neurological: +difficulty walking       Review of Systems    Objective:      Vitals:    12/24/24 1103   BP: 114/72   BP Location: Left arm   Patient Position: Sitting   Pulse: 60   Resp: 12   Temp: 96.9 °F (36.1 °C)   TempSrc: Temporal   SpO2: 97%   Weight: 52.7 kg (116 lb 4.7 oz)   Height: 5' 4" (1.626 m)     BP Readings from Last 5 Encounters:   12/24/24 114/72   11/12/24 122/74   10/08/24 130/82   12/22/23 124/82   07/05/23 118/72     Wt Readings from Last 5 Encounters: "   12/24/24 52.7 kg (116 lb 4.7 oz)   11/12/24 53 kg (116 lb 13.5 oz)   10/08/24 51.1 kg (112 lb 10.5 oz)   12/22/23 51.4 kg (113 lb 3.3 oz)   07/05/23 52 kg (114 lb 10.2 oz)     Physical Exam  Physical Exam    Vitals: Weight: 116 lbs. BMI: under 20.  General: Well-developed. Well-nourished. No acute distress.  Eyes: EOMI. Sclerae anicteric.  HENT: Normocephalic. Atraumatic. Nares patent. Moist oral mucosa.  Cardiovascular: Regular rate. Regular rhythm. No murmurs. No rubs. No gallops. Normal S1, S2.  Respiratory: Normal respiratory effort. Clear to auscultation bilaterally. No rales. No rhonchi. No wheezing.  Musculoskeletal: No  obvious deformity.  Extremities: No lower extremity edema.  Neurological: Alert & oriented x3. No slurred speech. Normal gait.  Psychiatric: Normal mood. Normal affect. Good insight. Good judgment.  Skin: Warm. Dry. No rash.         Lab Results   Component Value Date    WBC 3.47 (L) 12/31/2023    HGB 11.8 (L) 12/31/2023    HCT 36.3 (L) 12/31/2023     12/31/2023    CHOL 209 (H) 12/31/2023    TRIG 41 12/31/2023    HDL 76 (H) 12/31/2023    ALT 27 12/31/2023    AST 28 12/31/2023     12/31/2023    K 4.2 12/31/2023     12/31/2023    CREATININE 0.7 12/31/2023    BUN 17 12/31/2023    CO2 27 12/31/2023    TSH 1.838 12/31/2023    INR 1.0 12/24/2020      This note was generated with the assistance of ambient listening technology. Verbal consent was obtained by the patient and accompanying visitor(s) for the recording of patient appointment to facilitate this note. I attest to having reviewed and edited the generated note for accuracy, though some syntax or spelling errors may persist. Please contact the author of this note for any clarification.

## 2025-01-06 ENCOUNTER — TELEPHONE (OUTPATIENT)
Dept: GASTROENTEROLOGY | Facility: CLINIC | Age: 65
End: 2025-01-06
Payer: MEDICARE

## 2025-01-06 NOTE — TELEPHONE ENCOUNTER
----- Message from Esau Santoro sent at 1/6/2025 11:47 AM CST -----  Regarding: FW: Need to cancel    ----- Message -----  From: Poly Rivera  Sent: 1/6/2025  11:45 AM CST  To: [facility] Staff  Subject: Need to cancel                                   Name of Who is Calling:Lily            What is the request in detail:Pt is requesting a call back because she need to cancel her upcoming appointment.            Can the clinic reply by [facility]:No            What Number to Call Back if not in [facility]: [phone]

## 2025-01-14 ENCOUNTER — OFFICE VISIT (OUTPATIENT)
Dept: SLEEP MEDICINE | Facility: CLINIC | Age: 65
End: 2025-01-14
Payer: MEDICARE

## 2025-01-14 VITALS
BODY MASS INDEX: 19.57 KG/M2 | WEIGHT: 114.63 LBS | DIASTOLIC BLOOD PRESSURE: 86 MMHG | HEIGHT: 64 IN | SYSTOLIC BLOOD PRESSURE: 120 MMHG

## 2025-01-14 DIAGNOSIS — G47.33 OSA ON CPAP: Primary | ICD-10-CM

## 2025-01-14 PROCEDURE — 99999 PR PBB SHADOW E&M-EST. PATIENT-LVL II: CPT | Mod: PBBFAC,,, | Performed by: NURSE PRACTITIONER

## 2025-01-14 PROCEDURE — 3074F SYST BP LT 130 MM HG: CPT | Mod: CPTII,S$GLB,, | Performed by: NURSE PRACTITIONER

## 2025-01-14 PROCEDURE — 99214 OFFICE O/P EST MOD 30 MIN: CPT | Mod: S$GLB,,, | Performed by: NURSE PRACTITIONER

## 2025-01-14 PROCEDURE — 1101F PT FALLS ASSESS-DOCD LE1/YR: CPT | Mod: CPTII,S$GLB,, | Performed by: NURSE PRACTITIONER

## 2025-01-14 PROCEDURE — 1126F AMNT PAIN NOTED NONE PRSNT: CPT | Mod: CPTII,S$GLB,, | Performed by: NURSE PRACTITIONER

## 2025-01-14 PROCEDURE — 1159F MED LIST DOCD IN RCRD: CPT | Mod: CPTII,S$GLB,, | Performed by: NURSE PRACTITIONER

## 2025-01-14 PROCEDURE — 3008F BODY MASS INDEX DOCD: CPT | Mod: CPTII,S$GLB,, | Performed by: NURSE PRACTITIONER

## 2025-01-14 PROCEDURE — 3288F FALL RISK ASSESSMENT DOCD: CPT | Mod: CPTII,S$GLB,, | Performed by: NURSE PRACTITIONER

## 2025-01-14 PROCEDURE — 3079F DIAST BP 80-89 MM HG: CPT | Mod: CPTII,S$GLB,, | Performed by: NURSE PRACTITIONER

## 2025-01-14 NOTE — PROGRESS NOTES
Cc: EVELIO    She underwent PSG revealing mild positional EVELIO and began apap 5-12cm. For the first few weeks all was fine and dandy then after cleaning it and back from a trip she had elevated AHI for 4nights but this has since resolved, all normal AHI again. She has less nocturnal palpitations since using PAP, only twice (one of these high ahi she was very stressed). ESS=2. Denies pressure intolerance. Nose drying resolved with higher humidity. Wonders if neck botox could have contributed to the sleep apnea. Palpitations began 2022 then resolved for 2 yrs and returned last year.   Trouble swallowing sometimes  She is tired due to her work schedule    Interrogation 30davg 6h/n AHI 4.0, 90 %tile 7.8-9.7cm (ahi 4nights was 17-28)    SH: single  HST AHI 6.8/low sat 83% (supine AHI 12)    ASSESSMENT:   EVELIO. She is adherent with PAP, benefiting. AHI<5  She has medical comorbidities of paroxsymal SVT, nocturnal palpitations    Plan:  Continue apap 5-12cm, plan to repeat PSG 6mos (will have been 9mos w/o botox by that time) reassess ahi  DME [facility] supplies  Discussed control of EVELIO

## 2025-02-10 ENCOUNTER — PATIENT MESSAGE (OUTPATIENT)
Dept: SLEEP MEDICINE | Facility: CLINIC | Age: 65
End: 2025-02-10
Payer: MEDICARE

## 2025-03-24 DIAGNOSIS — Z00.00 ENCOUNTER FOR MEDICARE ANNUAL WELLNESS EXAM: ICD-10-CM

## 2025-03-26 ENCOUNTER — OFFICE VISIT (OUTPATIENT)
Dept: SLEEP MEDICINE | Facility: CLINIC | Age: 65
End: 2025-03-26
Payer: MEDICARE

## 2025-03-26 VITALS
DIASTOLIC BLOOD PRESSURE: 88 MMHG | HEIGHT: 64 IN | WEIGHT: 112.44 LBS | BODY MASS INDEX: 19.2 KG/M2 | HEART RATE: 65 BPM | SYSTOLIC BLOOD PRESSURE: 150 MMHG

## 2025-03-26 DIAGNOSIS — G47.33 OSA ON CPAP: Primary | ICD-10-CM

## 2025-03-26 PROCEDURE — 3077F SYST BP >= 140 MM HG: CPT | Mod: CPTII,S$GLB,, | Performed by: NURSE PRACTITIONER

## 2025-03-26 PROCEDURE — 3008F BODY MASS INDEX DOCD: CPT | Mod: CPTII,S$GLB,, | Performed by: NURSE PRACTITIONER

## 2025-03-26 PROCEDURE — 99999 PR PBB SHADOW E&M-EST. PATIENT-LVL II: CPT | Mod: PBBFAC,,, | Performed by: NURSE PRACTITIONER

## 2025-03-26 PROCEDURE — 1159F MED LIST DOCD IN RCRD: CPT | Mod: CPTII,S$GLB,, | Performed by: NURSE PRACTITIONER

## 2025-03-26 PROCEDURE — 1101F PT FALLS ASSESS-DOCD LE1/YR: CPT | Mod: CPTII,S$GLB,, | Performed by: NURSE PRACTITIONER

## 2025-03-26 PROCEDURE — 3288F FALL RISK ASSESSMENT DOCD: CPT | Mod: CPTII,S$GLB,, | Performed by: NURSE PRACTITIONER

## 2025-03-26 PROCEDURE — 1126F AMNT PAIN NOTED NONE PRSNT: CPT | Mod: CPTII,S$GLB,, | Performed by: NURSE PRACTITIONER

## 2025-03-26 PROCEDURE — 99212 OFFICE O/P EST SF 10 MIN: CPT | Mod: S$GLB,,, | Performed by: NURSE PRACTITIONER

## 2025-03-26 PROCEDURE — 3079F DIAST BP 80-89 MM HG: CPT | Mod: CPTII,S$GLB,, | Performed by: NURSE PRACTITIONER

## 2025-03-26 NOTE — PROGRESS NOTES
Cc: EVELIO    She felt she was sleeping very well and denied daytime tiredness after beginning CPAP. Palpitations at night also resolved. She is having recurrent difficulties with the machine pressure. Had it checked at DME and told all was okay. If she opens her mouth now no air is coming out in mouth and even at cpap 11cm if awakens middle of the night, it doesn't feel like any air is coming out  Airsense 11cm. Manometer 4cm/ramp settings. Ahi<10    SH: single  HST AHI 6.8/low sat 83% (supine AHI 12)    ASSESSMENT:   EVELIO. She is adherent with PAP, benefiting. AHI<5. Machine malfunctioning  She has medical comorbidities of paroxsymal SVT, nocturnal palpitations    Plan:  Continue apap 5-12cm. GET machine exchanged/still under warranty and keep in touch status  DME [facility] supplies  Discussed control of EVELIO

## 2025-04-01 ENCOUNTER — PATIENT MESSAGE (OUTPATIENT)
Dept: SLEEP MEDICINE | Facility: CLINIC | Age: 65
End: 2025-04-01
Payer: MEDICARE

## 2025-04-17 ENCOUNTER — PATIENT MESSAGE (OUTPATIENT)
Dept: SLEEP MEDICINE | Facility: CLINIC | Age: 65
End: 2025-04-17
Payer: MEDICARE

## 2025-04-17 DIAGNOSIS — G47.33 OSA (OBSTRUCTIVE SLEEP APNEA): Primary | ICD-10-CM

## 2025-04-22 ENCOUNTER — TELEPHONE (OUTPATIENT)
Dept: SLEEP MEDICINE | Facility: OTHER | Age: 65
End: 2025-04-22
Payer: MEDICARE

## 2025-04-24 ENCOUNTER — HOSPITAL ENCOUNTER (OUTPATIENT)
Dept: SLEEP MEDICINE | Facility: OTHER | Age: 65
Discharge: HOME | End: 2025-04-24
Attending: NURSE PRACTITIONER
Payer: MEDICARE

## 2025-04-24 ENCOUNTER — TELEPHONE (OUTPATIENT)
Dept: SLEEP MEDICINE | Facility: OTHER | Age: 65
End: 2025-04-24
Payer: MEDICARE

## 2025-04-24 DIAGNOSIS — G47.33 OSA (OBSTRUCTIVE SLEEP APNEA): ICD-10-CM

## 2025-04-24 PROCEDURE — 95811 POLYSOM 6/>YRS CPAP 4/> PARM: CPT

## 2025-04-25 NOTE — PROGRESS NOTES
An overnight titration study was performed on 64 year old female, Lily Fraire. The following was explained to the pt prior to the study: the time to bed and wake time, the set-up process timeframe and the purpose of each sensor, the reason (if sensors fall off) the technician will need to enter the room during the night, and how to call out for assistance during the night. A post-study letter was handed to the pt in the morning.     Mask used: pt used home mask. (N30i)

## 2025-04-25 NOTE — PROCEDURES
"[facility]    Titration Interpretation Report    Patient Name:  Lily Fraire  MRN#:  [id number]  :  1960  Study Date:  2025  Referring Provider:  PEARL Varela    The patient is a 64 year old Female who is 5' 4" and weighs 112.0 lbs.  Her BMI equals 19.4.  A full night PAP titration was performed.    Polysomnogram Data  A full night polysomnogram recorded the standard physiologic parameters including EEG, EOG, EMG, EKG, nasal and oral airflow.  Respiratory parameters of chest and abdominal movements were recorded with (RIP) Respiratory Inductance Plethsmography.  Oxygen saturation was recorded by pulse oximetry.    Titration Summary  The patient was titrated at pressures ranging from 5* cm/H20 with supplemental oxygen at - up to 10* cm/H20 with supplemental oxygen at -.  The last pressure used in the study was 10* cm/H20 with supplemental oxygen at -.    Sleep Architecture  The total recording time of the polysomnogram was 383.6 minutes.  The total sleep time was 328.5 minutes.  The patient spent 21.5% of total sleep time in Stage N1, 60.7% in Stage N2, 2.3% in Stages N3, and 15.5% in REM.  Sleep latency was 7.5 minutes.  REM latency was 39.5 minutes.  Sleep Efficiency was 85.6%.  Total wake time was 55.5 minutes for a total wake percentage of 12.4%.  Wake after Sleep Onset was 47.5 minutes.    Respiratory Summary  The polysomnogram revealed a presence of 3 obstructive, 4 central, and - mixed apneas resulting in Total Apnea index of 1.3 events per hour.  There were 2 hypopneas resulting in Total Hypopnea index of 0.4 events per hour.  The combined Apnea/Hypopnea index was 1.6 events per hour.  There were a total of 1 RERA events resulting in a Respiratory Disturbance Index (RDI) of 1.8 events per hour.     Mean oxygen saturation was 96.1%.  The lowest oxygen saturation during sleep was 84.0%.  Time spent <=88% oxygen saturation was 0.5 minutes (0.1%).    End Tidal CO2 during sleep ranged " "from - to - mmHg. End Tidal CO2 was greater than 50 mmHg for - minutes and greater than 55 mmHg for - minutes.  Transcutaneous CO2 during sleep ranged from - to - mmHg. Transcutaneous CO2 was greater than 50 mmHg for - minutes and greater than 55 mmHg for - minutes.    Limb Movement Activity  There were - limb movements recorded.  Of this total, - were classified as PLMs.  Of the PLMs, - were associated with arousals.  The Limb Movement index was - per hour while the PLM index was - per hour and PLM with arousals index was - per hour.    Cardiac: single lead EKG revealed normal sinus rhythm     Mask: pt used home mask. (N30i)   CPAP = 5 cwp was largely effective in supine position in stage N2 sleep  CPAP = 5 cwp was partially effective in supine position in stage REM sleep  CPAP = 7 cwp was effective in supine position in stage REM sleep with occasional breakthrough events    Sleep onset centrals were noted, but these appeared to convert to obstructive hypopneas at higher pressures   The mask used in this study worked well  The patients response to the study:  " she feels like she slept good with no palpitations. "    Oxygenation:  No significant hypoxemia was observed     Impression:  -obstructive sleep apnea    Recommendations:  -initial auto-CPAP settings CPAP min = 7 cwp  and CPAP max =  9 cwp  -if the patient complains of sleep disruption, if possible, CPAP min should be adjusted to 8cwp or higher to control events in stage REM sleep   -ramp = 7 cwp or higher to achieve sleep onset  -the patient has follow up with Sleep Medicine        Antelmo Bonds MD    (This Sleep Study was interpreted by a Board Certified Sleep Specialist who conducted an epoch-by-epoch review of the entire raw data recording.)  (The indication for this sleep study was reviewed and deemed appropriate by AASM Practice Parameters or other reasons by a Board Certified Sleep Specialist.)        [facility]     Titration " "Report     Patient Name: Lily Fraire Study Date: 4/24/2025   YOB: 1960 MRN #: [id number]   Age: 64 year MERCED #: -   Sex: Female Referring Provider: PEARL Varela   Height: 5' 4" Recording Tech: Rocio Moreira RRT SDS   Weight: 112.0 lbs Scoring Tech: Enrique Coyle RRT RPSGT   BMI: 19.4 Interpreting Physician: -   ESS: - Neck Circumference: -      Study Overview     Lights Off: 10:36:41 PM   Count Index   Lights On: 05:00:17 AM Awakenings: 37 6.8   Time in Bed: 383.6 min. Arousals: 146 26.7   Total Sleep Time: 328.5 min. Apneas & Hypopneas: 9 1.6    Sleep Efficiency: 85.6% Limb Movements: - -   Sleep Latency: 7.5 min. Snores: - -   Wake After Sleep Onset: 47.5 min. Desaturations: 5 0.9    REM Latency from Sleep Onset: 39.5 min. Minimum SpO2 TST: 84.0%       Sleep Architecture                                                                                                                           % of Time in Bed     Stages Time (mins) % Sleep Time   Wake 55.5     Stage N1 70.5 21.5%   Stage N2 199.5 60.7%   Stage N3 7.5 2.3%   REM 51.0 15.5%         Arousal Summary       NREM REM Sleep Index   Respiratory Arousals 4 1 5 0.9   PLM Arousals - - - -   Isolated Limb Movement Arousals - - - -   Spontaneous Arousals 131 10 141 25.8   Total 135 11 146 26.7         Limb Movement Summary       Count Index   Isolated Limb Movements - -   Periodic Limb Movements (PLMs) - -   Total Limb Movements - -       Respiratory Summary       By Sleep Stage By Body Position Total    NREM REM Supine Non-Supine    Time (min) 277.5 51.0 242.0 86.5 328.5                 Obstructive Apnea 3 - 3 - 3   Mixed Apnea - - - - -   Central Apnea 4 - 4 - 4   Central Apnea Index 0.9 - 1.3 - 0.9   Total Apneas 7 - 7 - 7   Total Apnea Index 1.5 - 1.7 - 1.3                 Total Hypopnea 2 - 2 - 2   Total Hypopnea Index 0.4 - 0.5 - 0.4                 Apnea & Hypopnea 9 - 9 - 9   Apnea & Hypopnea Index 1.9 - 2.2 - 1.6                 RERAs - 1 1 - 1 "   RERA Index - 1.2 0.2 - 0.2                 RDI 1.9 1.2 2.5 - 1.8      Scoring Criteria: Hypopneas scored at 4% desaturation criteria.     Respiratory Event Durations       Apnea Hypopnea    NREM REM NREM REM   Average (seconds) 17.0 - 13.3 -   Maximum (seconds) 36.7 - 15.3 -         Oxygen Saturation Summary       Wake NREM REM TST Total   Average SpO2 96.7% 95.9% 96.0% 96.0% 96.1%   Minimum SpO2 91.0% 84.0% 94.0% 84.0% 84.0%   Maximum SpO2 100.0% 98.0% 98.0% 98.0% 100.0%      Oxygen Saturation Distribution     Range (%) Time in range (min) Time in range (%)    90.0 - 100.0 382.1 99.7%   80.0 - 90.0 1.1 0.3%   70.0 - 80.0 - -   60.0 - 70.0 - -   50.0 - 60.0 - -   0.0 - 50.0 - -   Time Spent <=88% SpO2     Range (%) Time in range (min) Time in range (%)   0.0 - 88.0 0.5 0.1%              Count Index   Desaturations 5 0.9           Cardiac Summary       Wake NREM REM Sleep Total   Average Pulse Rate (BPM) 57.9 54.5 56.1 54.7 55.2   Minimum Pulse Rate (BPM) 51.0 50.0 50.0 50.0 50.0   Maximum Pulse Rate (BPM) 78.0 66.0 69.0 69.0 78.0      Pulse Rate Distribution     Range (bpm) Time in range (min) Time in range (%)   0.0 - 40.0 - -   40.0 - 60.0 370.0 96.4%   60.0 - 80.0 13.8 3.6%   80.0 - 100.0 - -   100.0 - 120.0 - -   120.0 - 140.0 - -   140.0 - 200.0 - -      EtCO2 Summary     Stage Min (mmHg) Average (mmHg) Max (mmHg)   Wake - - -   NREM(1+2+3) - - -   REM - - -      Range (mmHg) Time in range (min) Time in range (%)   20.0 - 40.0 - -   40.0 - 50.0 - -   50.0 - 55.0 - -   55.0 - 100.0 - -   Excluded data <20.0 & >65.0 384.0 100.0%      TcCO2 Summary     Stage Min (mmHg) Average (mmHg) Max (mmHg)   Wake - - -   NREM(1+2+3) - - -   REM - - -      Range (mmHg) Time in range (min) Time in range (%)   20.0 - 40.0 - -   40.0 - 50.0 - -   50.0 - 55.0 - -   55.0 - 100.0 - -   Excluded data <20.0 & >65.0 384.0 100.0%      Comments     -         Titration Summary     PAP Device PAP Level O2 Level Time (min) TST (min) NREM  (min) REM (min) Wake (min) Sleep Eff% OA# CA# MA# Hyp# AHI RERA RDI Min SpO2 SpO2 <=88% (min) Ar. Index   CPAP 5 - 84.5 75.0 65.0 10.0 9.5 88.8% - 4 - 2 4.8 - 4.8 84.0  0.2 8.8   CPAP 7 - 59.5 56.0 41.5 14.5 3.5 94.1% 2 - - - 2.1 - 2.1 85.0  0.3 9.6   CPAP 8 - 127.0 111.5 108.5 3.0 15.5 87.8% 1 - - - 0.5 - 0.5 91.0  0.0 29.1   CPAP 9 - 82.0 61.0 37.5 23.5 21.0 74.4% - - - - - 1 1.0 93.0  0.0 32.5   CPAP 10 - 31.0 25.0 25.0 0.0 6.0 80.6% - - - - - - - 95.0  0.0 93.6

## 2025-05-01 ENCOUNTER — RESULTS FOLLOW-UP (OUTPATIENT)
Dept: SLEEP MEDICINE | Facility: CLINIC | Age: 65
End: 2025-05-01

## 2025-05-02 ENCOUNTER — TELEPHONE (OUTPATIENT)
Dept: SLEEP MEDICINE | Facility: CLINIC | Age: 65
End: 2025-05-02
Payer: MEDICARE

## 2025-05-02 ENCOUNTER — PATIENT MESSAGE (OUTPATIENT)
Dept: SLEEP MEDICINE | Facility: CLINIC | Age: 65
End: 2025-05-02
Payer: MEDICARE

## 2025-05-02 NOTE — TELEPHONE ENCOUNTER
Machine was changed out but new SN not yet added to airview, will notify her when this is done and monitor ahi. Still having palpitations, didn't have any during study and no chin stra pused

## 2025-05-06 ENCOUNTER — TELEPHONE (OUTPATIENT)
Dept: SLEEP MEDICINE | Facility: CLINIC | Age: 65
End: 2025-05-06
Payer: MEDICARE

## 2025-05-06 NOTE — TELEPHONE ENCOUNTER
Reviewed airview: APAP 7-10cm  05/02/2025 - 05/05/2025 Usage days 4/4 days (100%)   >= 4 hours 3 days (75%)    Average usage (days used) 4 hours 40 minutes   95th percentile: 9.6 cm   AHI: 1.5

## 2025-06-13 ENCOUNTER — PATIENT MESSAGE (OUTPATIENT)
Dept: ADMINISTRATIVE | Facility: HOSPITAL | Age: 65
End: 2025-06-13
Payer: MEDICARE

## 2025-07-31 ENCOUNTER — HOSPITAL ENCOUNTER (OUTPATIENT)
Dept: RADIOLOGY | Facility: HOSPITAL | Age: 65
Discharge: HOME | End: 2025-07-31
Attending: FAMILY MEDICINE
Payer: MEDICARE

## 2025-07-31 DIAGNOSIS — Z12.31 ENCOUNTER FOR SCREENING MAMMOGRAM FOR BREAST CANCER: ICD-10-CM

## 2025-07-31 PROCEDURE — 77067 SCR MAMMO BI INCL CAD: CPT | Mod: TC

## 2025-08-29 ENCOUNTER — PATIENT MESSAGE (OUTPATIENT)
Dept: SLEEP MEDICINE | Facility: CLINIC | Age: 65
End: 2025-08-29
Payer: MEDICARE